# Patient Record
Sex: FEMALE | Race: WHITE | URBAN - METROPOLITAN AREA
[De-identification: names, ages, dates, MRNs, and addresses within clinical notes are randomized per-mention and may not be internally consistent; named-entity substitution may affect disease eponyms.]

---

## 2017-11-16 ENCOUNTER — TRANSFERRED RECORDS (OUTPATIENT)
Dept: HEALTH INFORMATION MANAGEMENT | Facility: CLINIC | Age: 67
End: 2017-11-16

## 2017-11-28 ENCOUNTER — TRANSFERRED RECORDS (OUTPATIENT)
Dept: HEALTH INFORMATION MANAGEMENT | Facility: CLINIC | Age: 67
End: 2017-11-28

## 2018-03-22 ENCOUNTER — OFFICE VISIT (OUTPATIENT)
Dept: DERMATOLOGY | Facility: CLINIC | Age: 68
End: 2018-03-22
Payer: COMMERCIAL

## 2018-03-22 DIAGNOSIS — R21 RASH: Primary | ICD-10-CM

## 2018-03-22 DIAGNOSIS — L28.0 LICHEN SIMPLEX CHRONICUS: ICD-10-CM

## 2018-03-22 RX ORDER — NYSTATIN 100000 U/G
CREAM TOPICAL 2 TIMES DAILY
COMMUNITY

## 2018-03-22 RX ORDER — AMLODIPINE BESYLATE 5 MG/1
5 TABLET ORAL DAILY
COMMUNITY

## 2018-03-22 RX ORDER — VENLAFAXINE HYDROCHLORIDE 150 MG/1
150 CAPSULE, EXTENDED RELEASE ORAL DAILY
COMMUNITY

## 2018-03-22 RX ORDER — ERGOCALCIFEROL 1.25 MG/1
50000 CAPSULE, LIQUID FILLED ORAL WEEKLY
COMMUNITY

## 2018-03-22 RX ORDER — HYDRALAZINE HYDROCHLORIDE 50 MG/1
50 TABLET, FILM COATED ORAL 2 TIMES DAILY
COMMUNITY

## 2018-03-22 RX ORDER — TRIAMCINOLONE ACETONIDE 1 MG/G
OINTMENT TOPICAL
Qty: 80 G | Refills: 4 | Status: SHIPPED | OUTPATIENT
Start: 2018-03-22 | End: 2018-03-22

## 2018-03-22 RX ORDER — LIDOCAINE HYDROCHLORIDE AND EPINEPHRINE 10; 10 MG/ML; UG/ML
3 INJECTION, SOLUTION INFILTRATION; PERINEURAL ONCE
Qty: 3 ML | Refills: 0 | OUTPATIENT
Start: 2018-03-22 | End: 2018-03-22

## 2018-03-22 RX ORDER — TRIAMCINOLONE ACETONIDE 1 MG/G
OINTMENT TOPICAL
Qty: 80 G | Refills: 4 | Status: SHIPPED | OUTPATIENT
Start: 2018-03-22

## 2018-03-22 RX ORDER — GINSENG 100 MG
1 CAPSULE ORAL DAILY
COMMUNITY

## 2018-03-22 ASSESSMENT — ACTIVITIES OF DAILY LIVING (ADL)
NUMBER_OF_TIMES_PATIENT_HAS_FALLEN_WITHIN_LAST_SIX_MONTHS: 4
DRESS: 0-->INDEPENDENT
CHANGE_IN_FUNCTIONAL_STATUS_SINCE_ONSET_OF_CURRENT_ILLNESS/INJURY: NO
RETIRED_COMMUNICATION: 0-->UNDERSTANDS/COMMUNICATES WITHOUT DIFFICULTY
TRANSFERRING: 1-->ASSISTIVE EQUIPMENT
RETIRED_EATING: 0-->INDEPENDENT
TRANSFERRING: 1 - ASSISTIVE EQUIPMENT
DRESS: 0 - INDEPENDENT
SWALLOWING: 0 - SWALLOWS FOODS/LIQUIDS WITHOUT DIFFICULTY
COGNITION: 0 - NO COGNITION ISSUES REPORTED
BATHING: 1-->ASSISTIVE EQUIPMENT
TOILETING: 1-->ASSISTIVE EQUIPMENT
COMMUNICATION: 0 - UNDERSTANDS/COMMUNICATES WITHOUT DIFFICULTY
TOILETING: 1 - ASSISTIVE EQUIPMENT
AMBULATION: 1 - ASSISTIVE EQUIPMENT
BATHING: 0 - INDEPENDENT
AMBULATION: 1-->ASSISTIVE EQUIPMENT
EATING: 0 - INDEPENDENT
FALL_HISTORY_WITHIN_LAST_SIX_MONTHS: YES
SWALLOWING: 0-->SWALLOWS FOODS/LIQUIDS WITHOUT DIFFICULTY

## 2018-03-22 ASSESSMENT — PAIN SCALES - GENERAL: PAINLEVEL: NO PAIN (0)

## 2018-03-22 NOTE — NURSING NOTE
Dermatology Rooming Note    Annette Longo's goals for this visit include:   Chief Complaint   Patient presents with     Derm Problem     Annette is returning to discuss eczema.     Carissa Rodrigues LPN

## 2018-03-22 NOTE — LETTER
3/22/2018       RE: Treva Longo  [address] [place] [id number]     Dear Colleague,    Thank you for referring your patient, Treva Longo, to the [facility] DERMATOLOGY at [facility]. Please see a copy of my visit note below.    CHIEF COMPLAINT:  Chronic itchy rash on back, buttocks and legs.      HISTORY OF PRESENT ILLNESS:  Treva is a very pleasant 67-year-old female who is a new patient to our clinic presenting with a 5-year history of a chronic intractable pruritic scaly eruption on her buttocks, back and legs.  She has previously seen Dr. Minna Gaspar at [facility] in [place], who has tried a variety of therapies including many topical steroids including triamcinolone, as well as both oral and injectable methotrexate and UVB phototherapy.  Treva is highly frustrated in that some of these therapies have been transiently helpful but they tend to lose their effect as soon as she stops them.  She reports that she did up to 80 treatments of UVB light therapy and had some good improvement but that the itch and rash rapidly recurred once she stopped.  Similarly, she had some improvement on injectable methotrexate, but the rash rapidly recurred with discontinuation.  Today, she is very frustrated.  She is looking for a long-term solution for this issue.  She describes intensely itchy spots which she scratches at bedtime and while she is sleeping, most notably on her buttocks, but also on legs and back.  Prior biopsies have shown features consistent with lichen simplex chronicus.      REVIEW OF SYSTEMS:  No recent fevers or chills.  No oral sores.      PAST MEDICAL HISTORY:  She has had a knee replacement with MRSA infection.  She sees Dr. Birch, an Infectious Disease specialist, who has her currently taking Keflex once daily long-term.      PHYSICAL EXAMINATION:   GENERAL:  This is a well-appearing, well-nourished, older female with a  normal mood and affect who is oriented x3.   SKIN:  A cutaneous exam of the head, neck, chest, abdomen, back, bilateral upper and lower extremities was performed.  Symmetrically surrounding the gluteal cleft, there are lichenified erythematous scaly plaques with fine branny scale.  Similar-appearing lichenified scaly papules are present on her lower back and legs.  Excoriated eroded papules are also present on the thighs.      ASSESSMENT/PLAN:  Exam features most consistent with a lichenified chronic eczematous dermatitis and a prior biopsy showing LSC.  I also considered the possibility of genito-gluteal type porokeratosis or lichenified psoriasis, given the symmetric pattern around her buttocks.  A biopsy was performed today largely to exclude these possibilities.  Please see the procedure note below.  Otherwise, we had a long discussion about possible treatment options for chronically lichenified eczema.  We discussed the possibility of UVA phototherapy here in our office, but it is very difficult for her to travel from [place] to our clinic; this does not seem to be a reasonable option at this time.  We also discussed revisiting methotrexate or possibly dupilumab.  There has been a concern about her history of MRSA infection and whether dupilumab would be safe.  Our somewhat limited experience (as dupilumab has only been available for approximately 1 year's time) is that this is generally safe and not highly immunosuppressive, and I believe that a cautious trial of this medication might be reasonable if she fails other modalities.  Today we discussed intensive topical therapy with a soak and smear method and I would like to attempt to prove to Treva that this is effective long-term.  We gave her a prescription for triamcinolone 80 grams to be mixed in its entirety with 16 ounces of a bland moisturizing cream and kept in the refrigerator.  At bedtime, she will take a dilute bleach bath with 1/2 cup of  plain Clorox in a full tub of and then apply this mixture and wear a sauna suit to bed overnight.  I gave her printed instructions regarding obtainin.  Nowata moisturizers.   2.  Sauna suit on a website such as Amazon.   3.  L'Applique, a lotion applicator which she can use to apply cream on her back and buttocks.      We will have her return to clinic in 3-4 weeks' time, at which time we will reassess and decide if dupilumab may be a next reasonable step.      PROCEDURE NOTE:  After signed informed consent, the affected area was  swabbed with an alcohol pad and injected with 1% lidocaine with epinephrine.  A 4 mm punch biopsy was taken from the left medial buttock and sent for histopathology.  The defect was closed with a Prolene 4-0 suture and covered with petrolatum and a bandage.  The patient tolerated this without complication.       Myles Garcia MD  Dermatology Attending              Pictures were placed in Pt's chart today for future reference.

## 2018-03-22 NOTE — MR AVS SNAPSHOT
"              After Visit Summary   3/22/2018    Annette Longo    MRN: [id number]           Patient Information     Date Of Birth          1950        Visit Information        Provider Department      3/22/2018 1:00 PM Myles Garcia MD [facility] Dermatology        Today's Diagnoses     Rash    -  1    Lichen simplex chronicus          Care Instructions    Soak and Smear method    Bleach baths:    Add one-half cup of plain Clorox bleach to a full tub (Not \"splashless\")  Soak for 15 minutes  Gently dry off (pat dry)  Smear the mixture over all your itchy skin  Wear a sauna suit to bed    Mixture is:  Entire tube of triamcinolone ointment  Mixed with a 16 ounce jar of moisturizer (Examples: Vanicream, Cetaphil or Eucerin cream)    Cindy grocery list:    1. Sauna suit  Amazon, [employer] or other websites  https://www.SensorTran/Freshtake Media-JibJabories-Sauna-Small-Medium/dp/I300S0EKZO/ref=sr_1_13?ie=UTF8&ozt=4923437229&sr=8-13&keywords=sauna+suit    2. L'Applique  http://www.FUZE Fit For A Kid!pliqueDiabetes America/    3. 16 ounce jars of cream  [facility] or [facility]    Wound Care After a Biopsy    What is a skin biopsy?  A skin biopsy allows the doctor to examine a very small piece of tissue under the microscope to determine the diagnosis and the best treatment for the skin condition. A local anesthetic (numbing medicine)  is injected with a very small needle into the skin area to be tested. A small piece of skin is taken from the area. Sometimes a suture (stitch) is used.     What are the risks of a skin biopsy?  I will experience scar, bleeding, swelling, pain, crusting and redness. I may experience incomplete removal or recurrence. Risks of this procedure are excessive bleeding, bruising, infection, nerve damage, numbness, thick (hypertrophic or keloidal) scar and non-diagnostic biopsy.    How should I care for my wound for the first 24 hours?    Keep the wound dry and covered for 24 hours    If it bleeds, hold direct pressure on the " area for 15 minutes. If bleeding does not stop then go to the emergency room    Avoid strenuous exercise the first 1-2 days or as your doctor instructs you    How should I care for the wound after 24 hours?    After 24 hours, remove the bandage    You may bathe or shower as normal    If you had a scalp biopsy, you can shampoo as usual and can use shower water to clean the biopsy site daily    Clean the wound twice a day with gentle soap and water    Do not scrub, be gentle    Apply white petroleum/Vaseline after cleaning the wound with a cotton swab or a clean finger, and keep the site covered with a Bandaid /bandage. Bandages are not necessary with a scalp biopsy    If you are unable to cover the site with a Bandaid /bandage, re-apply ointment 2-3 times a day to keep the site moist. Moisture will help with healing    Avoid strenuous activity for first 1-2 days    Avoid lakes, rivers, pools, and oceans until the stitches are removed or the site is healed    How do I clean my wound?    Wash hands thoroughly with soap or use hand  before all wound care    Clean the wound with gentle soap and water    Apply white petroleum/Vaseline  to wound after it is clean    Replace the Bandaid /bandage to keep the wound covered for the first few days or as instructed by your doctor    If you had a scalp biopsy, warm shower water to the area on a daily basis should suffice    What should I use to clean my wound?     Cotton-tipped applicators (Qtips )    White petroleum jelly (Vaseline ). Use a clean new container and use Q-tips to apply.    Bandaids   as needed    Gentle soap     How should I care for my wound long term?    Do not get your wound dirty    Keep up with wound care for one week or until the area is healed.    A small scab will form and fall off by itself when the area is completely healed. The area will be red and will become pink in color as it heals. Sun protection is very important for how your scar will  turn out. Sunscreen with an SPF 30 or greater is recommended once the area is healed.    If you have stitches, stitches need to be removed in 14 days. You may return to our clinic for this or you may have it done locally at your doctor s office.    You should have some soreness but it should be mild and slowly go away over several days. Talk to your doctor about using tylenol for pain,    When should I call my doctor?  If you have increased:     Pain or swelling    Pus or drainage (clear or slightly yellow drainage is ok)    Temperature over 100F    Spreading redness or warmth around wound    When will I hear about my results?  The biopsy results can take 2-3 weeks to come back. The clinic will call you with the results, send you a myMedScore message, or have you schedule a follow-up clinic or phone time to discuss the results. Contact our clinics if you do not hear from us in 3 weeks.     Who should I call with questions?    [facility]: [phone]     [facility]: [phone]    For urgent needs outside of business hours call the [facility] at [phone] and ask for the dermatology resident on call            Follow-ups after your visit        Your next 10 appointments already scheduled     Apr 17, 2018 11:00 AM CDT   (Arrive by 10:45 AM)   Return Visit with Myles Garcia MD   [facility] Dermatology ([facility])    [address] [place] [id number] [phone]              Who to contact     Please call your clinic at [phone] to:    Ask questions about your health    Make or cancel appointments    Discuss your medicines    Learn about your test results    Speak to your doctor            Additional Information About Your Visit        Nursing Home Quality Information     [facility] is an electronic gateway that provides easy, online access to your medical records. With [facility], you can request a clinic  appointment, read your test results, renew a prescription or communicate with your care team.     To sign up for MyChart visit the website at [email or web address]   You will be asked to enter the access code listed below, as well as some personal information. Please follow the directions to create your username and password.     Your access code is: [email or web address]  Expires: 2018  7:30 AM     Your access code will  in 90 days. If you need help or a new code, please contact your [facility] or call [phone] for assistance.        Care EveryWhere ID     This is your Care EveryWhere ID. This could be used by other organizations to access your [place] medical records  [id number]         Blood Pressure from Last 3 Encounters:   No data found for BP    Weight from Last 3 Encounters:   No data found for Wt              We Performed the Following     BIOPSY SKIN/SUBQ/MUC MEM, SINGLE LESION     Dermatological path order and indications          Today's Medication Changes          These changes are accurate as of 3/22/18  2:11 PM.  If you have any questions, ask your nurse or doctor.               Start taking these medicines.        Dose/Directions    lidocaine 1% with EPINEPHrine 1:100,000 1 %-1:258341 injection   Used for:  Rash   Started by:  Myles Garcia MD        Dose:  3 mL   Inject 3 mLs into the skin once for 1 dose   Quantity:  3 mL   Refills:  0       triamcinolone 0.1 % ointment   Commonly known as:  KENALOG   Used for:  Lichen simplex chronicus   Started by:  Myles Garcia MD        Mix entire tube with 16ounce jar of moisturizer, apply at bedtime   Quantity:  80 g   Refills:  4            Where to get your medicines      These medications were sent to [facility] - [place], [place] - [address] AT [facility]  [address], [facility] [address]     Phone:  [phone]     triamcinolone 0.1 % ointment          Some of these will need a paper prescription and others can be bought over the counter.  Ask your nurse if you have questions.     You don't need a prescription for these medications     lidocaine 1% with EPINEPHrine 1:100,000 1 %-1:496579 injection                Primary Care Provider Office Phone # Fax #    Stewart Murphy [phone]       [facility] [address] [facility] [address]        Equal Access to Services     JACOB RUBI : Hadii aad ku hadasho Soomaali, waaxda luqadaha, qaybta kaalmada adeegyada, waxay idiin hayaan adeeg kharash lagwendolyn . So [facility] [phone].    ATENCIÓN: Si airam johnson, tiene a jefferson disposición servicios gratuitos de asistencia lingüística. [facility] [phone].    We comply with applicable federal civil rights laws and [place] laws. We do not discriminate on the basis of race, color, national origin, age, disability, sex, sexual orientation, or gender identity.            Thank you!     Thank you for choosing [facility] DERMATOLOGY  for your care. Our goal is always to provide you with excellent care. Hearing back from our patients is one way we can continue to improve our services. Please take a few minutes to complete the written survey that you may receive in the mail after your visit with us. Thank you!             Your Updated Medication List - Protect others around you: Learn how to safely use, store and throw away your medicines at www.disposemymeds.org.          This list is accurate as of 3/22/18  2:11 PM.  Always use your most recent med list.                   Brand Name Dispense Instructions for use Diagnosis    lidocaine 1% with EPINEPHrine 1:100,000 1 %-1:232041 injection     3 mL    Inject 3 mLs into the skin once for 1 dose    Rash       triamcinolone 0.1 % ointment    KENALOG    80 g    Mix entire tube with 16ounce jar of moisturizer, apply at bedtime    Lichen simplex chronicus

## 2018-03-22 NOTE — PATIENT INSTRUCTIONS
"Soak and Smear method    Bleach baths:    Add one-half cup of plain Clorox bleach to a full tub (Not \"splashless\")  Soak for 15 minutes  Gently dry off (pat dry)  Smear the mixture over all your itchy skin  Wear a sauna suit to bed    Mixture is:  Entire tube of triamcinolone ointment  Mixed with a 16 ounce jar of moisturizer (Examples: Vanicream, Cetaphil or Eucerin cream)    AnnetteSoraay list:    1. Sauna suit  Amazon, [employer] or other websites  https://www.GenSpera/KARL-Accessories-Sauna-Small-Medium/dp/O826T1JBAJ/ref=sr_1_13?ie=UTF8&vda=2127580081&sr=8-13&keywords=sauna+suit    2. L'Applique  http://www.Tenaxis MedicaliqueCare-n-Share/    3. 16 ounce jars of cream  [facility] or [facility]    Wound Care After a Biopsy    What is a skin biopsy?  A skin biopsy allows the doctor to examine a very small piece of tissue under the microscope to determine the diagnosis and the best treatment for the skin condition. A local anesthetic (numbing medicine)  is injected with a very small needle into the skin area to be tested. A small piece of skin is taken from the area. Sometimes a suture (stitch) is used.     What are the risks of a skin biopsy?  I will experience scar, bleeding, swelling, pain, crusting and redness. I may experience incomplete removal or recurrence. Risks of this procedure are excessive bleeding, bruising, infection, nerve damage, numbness, thick (hypertrophic or keloidal) scar and non-diagnostic biopsy.    How should I care for my wound for the first 24 hours?    Keep the wound dry and covered for 24 hours    If it bleeds, hold direct pressure on the area for 15 minutes. If bleeding does not stop then go to the emergency room    Avoid strenuous exercise the first 1-2 days or as your doctor instructs you    How should I care for the wound after 24 hours?    After 24 hours, remove the bandage    You may bathe or shower as normal    If you had a scalp biopsy, you can shampoo as usual and can use shower water to clean the " biopsy site daily    Clean the wound twice a day with gentle soap and water    Do not scrub, be gentle    Apply white petroleum/Vaseline after cleaning the wound with a cotton swab or a clean finger, and keep the site covered with a Bandaid /bandage. Bandages are not necessary with a scalp biopsy    If you are unable to cover the site with a Bandaid /bandage, re-apply ointment 2-3 times a day to keep the site moist. Moisture will help with healing    Avoid strenuous activity for first 1-2 days    Avoid lakes, rivers, pools, and oceans until the stitches are removed or the site is healed    How do I clean my wound?    Wash hands thoroughly with soap or use hand  before all wound care    Clean the wound with gentle soap and water    Apply white petroleum/Vaseline  to wound after it is clean    Replace the Bandaid /bandage to keep the wound covered for the first few days or as instructed by your doctor    If you had a scalp biopsy, warm shower water to the area on a daily basis should suffice    What should I use to clean my wound?     Cotton-tipped applicators (Qtips )    White petroleum jelly (Vaseline ). Use a clean new container and use Q-tips to apply.    Bandaids   as needed    Gentle soap     How should I care for my wound long term?    Do not get your wound dirty    Keep up with wound care for one week or until the area is healed.    A small scab will form and fall off by itself when the area is completely healed. The area will be red and will become pink in color as it heals. Sun protection is very important for how your scar will turn out. Sunscreen with an SPF 30 or greater is recommended once the area is healed.    If you have stitches, stitches need to be removed in 14 days. You may return to our clinic for this or you may have it done locally at your doctor s office.    You should have some soreness but it should be mild and slowly go away over several days. Talk to your doctor about using  tylenol for pain,    When should I call my doctor?  If you have increased:     Pain or swelling    Pus or drainage (clear or slightly yellow drainage is ok)    Temperature over 100F    Spreading redness or warmth around wound    When will I hear about my results?  The biopsy results can take 2-3 weeks to come back. The clinic will call you with the results, send you a Vital Viot message, or have you schedule a follow-up clinic or phone time to discuss the results. Contact our clinics if you do not hear from us in 3 weeks.     Who should I call with questions?    [facility]: [phone]     [facility]: [phone]    For urgent needs outside of business hours call the [facility] at [phone] and ask for the dermatology resident on call

## 2018-03-22 NOTE — NURSING NOTE
Lidocaine-epinephrine 1-1:313475 % injection   1.5mL once for one use, starting 3/22/2018 ending 3/22/2018,  2mL disp, R-0, injection  Injected by Nohemi Mak CMA

## 2018-03-22 NOTE — PROGRESS NOTES
CHIEF COMPLAINT:  Chronic itchy rash on back, buttocks and legs.      HISTORY OF PRESENT ILLNESS:  Treva is a very pleasant 67-year-old female who is a new patient to our clinic presenting with a 5-year history of a chronic intractable pruritic scaly eruption on her buttocks, back and legs.  She has previously seen Dr. Minna Gaspar at [facility] in [place], who has tried a variety of therapies including many topical steroids including triamcinolone, as well as both oral and injectable methotrexate and UVB phototherapy.  Treva is highly frustrated in that some of these therapies have been transiently helpful but they tend to lose their effect as soon as she stops them.  She reports that she did up to 80 treatments of UVB light therapy and had some good improvement but that the itch and rash rapidly recurred once she stopped.  Similarly, she had some improvement on injectable methotrexate, but the rash rapidly recurred with discontinuation.  Today, she is very frustrated.  She is looking for a long-term solution for this issue.  She describes intensely itchy spots which she scratches at bedtime and while she is sleeping, most notably on her buttocks, but also on legs and back.  Prior biopsies have shown features consistent with lichen simplex chronicus.      REVIEW OF SYSTEMS:  No recent fevers or chills.  No oral sores.      PAST MEDICAL HISTORY:  She has had a knee replacement with MRSA infection.  She sees Dr. Birch, an Infectious Disease specialist, who has her currently taking Keflex once daily long-term.      PHYSICAL EXAMINATION:   GENERAL:  This is a well-appearing, well-nourished, older female with a normal mood and affect who is oriented x3.   SKIN:  A cutaneous exam of the head, neck, chest, abdomen, back, bilateral upper and lower extremities was performed.  Symmetrically surrounding the gluteal cleft, there are lichenified erythematous scaly plaques with fine branny scale.   Similar-appearing lichenified scaly papules are present on her lower back and legs.  Excoriated eroded papules are also present on the thighs.      ASSESSMENT/PLAN:  Exam features most consistent with a lichenified chronic eczematous dermatitis and a prior biopsy showing LSC.  I also considered the possibility of genito-gluteal type porokeratosis or lichenified psoriasis, given the symmetric pattern around her buttocks.  A biopsy was performed today largely to exclude these possibilities.  Please see the procedure note below.  Otherwise, we had a long discussion about possible treatment options for chronically lichenified eczema.  We discussed the possibility of UVA phototherapy here in our office, but it is very difficult for her to travel from [place] to our clinic; this does not seem to be a reasonable option at this time.  We also discussed revisiting methotrexate or possibly dupilumab.  There has been a concern about her history of MRSA infection and whether dupilumab would be safe.  Our somewhat limited experience (as dupilumab has only been available for approximately 1 year's time) is that this is generally safe and not highly immunosuppressive, and I believe that a cautious trial of this medication might be reasonable if she fails other modalities.  Today we discussed intensive topical therapy with a soak and smear method and I would like to attempt to prove to Treva that this is effective long-term.  We gave her a prescription for triamcinolone 80 grams to be mixed in its entirety with 16 ounces of a bland moisturizing cream and kept in the refrigerator.  At bedtime, she will take a dilute bleach bath with 1/2 cup of plain Clorox in a full tub of and then apply this mixture and wear a sauna suit to bed overnight.  I gave her printed instructions regarding obtainin.  [place] moisturizers.   2.  Sauna suit on a website such as [employer].   3.  L'Applique, a lotion applicator which she can use to  apply cream on her back and buttocks.      We will have her return to clinic in 3-4 weeks' time, at which time we will reassess and decide if dupilumab may be a next reasonable step.      PROCEDURE NOTE:  After signed informed consent, the affected area was  swabbed with an alcohol pad and injected with 1% lidocaine with epinephrine.  A 4 mm punch biopsy was taken from the left medial buttock and sent for histopathology.  The defect was closed with a Prolene 4-0 suture and covered with petrolatum and a bandage.  The patient tolerated this without complication.       Myles Garcia MD  Dermatology Attending

## 2018-04-02 LAB — COPATH REPORT: NORMAL

## 2018-04-03 PROBLEM — R21 RASH: Status: ACTIVE | Noted: 2018-04-03

## 2018-04-03 PROBLEM — L28.0 LICHEN SIMPLEX CHRONICUS: Status: ACTIVE | Noted: 2018-04-03

## 2018-04-17 ENCOUNTER — OFFICE VISIT (OUTPATIENT)
Dept: DERMATOLOGY | Facility: CLINIC | Age: 68
End: 2018-04-17
Payer: COMMERCIAL

## 2018-04-17 ENCOUNTER — TELEPHONE (OUTPATIENT)
Dept: DERMATOLOGY | Facility: CLINIC | Age: 68
End: 2018-04-17

## 2018-04-17 DIAGNOSIS — L40.9 PSORIASIS: ICD-10-CM

## 2018-04-17 DIAGNOSIS — L40.9 PSORIASIS: Primary | ICD-10-CM

## 2018-04-17 LAB
ALBUMIN SERPL-MCNC: 3.3 G/DL (ref 3.4–5)
ALP SERPL-CCNC: 122 U/L (ref 40–150)
ALT SERPL W P-5'-P-CCNC: 22 U/L (ref 0–50)
ANION GAP SERPL CALCULATED.3IONS-SCNC: 6 MMOL/L (ref 3–14)
AST SERPL W P-5'-P-CCNC: 22 U/L (ref 0–45)
BASOPHILS # BLD AUTO: 0.1 10E9/L (ref 0–0.2)
BASOPHILS NFR BLD AUTO: 0.8 %
BILIRUB SERPL-MCNC: 0.3 MG/DL (ref 0.2–1.3)
BUN SERPL-MCNC: 21 MG/DL (ref 7–30)
CALCIUM SERPL-MCNC: 8.7 MG/DL (ref 8.5–10.1)
CHLORIDE SERPL-SCNC: 102 MMOL/L (ref 94–109)
CO2 SERPL-SCNC: 25 MMOL/L (ref 20–32)
CREAT SERPL-MCNC: 1.33 MG/DL (ref 0.52–1.04)
DIFFERENTIAL METHOD BLD: ABNORMAL
EOSINOPHIL # BLD AUTO: 0.9 10E9/L (ref 0–0.7)
EOSINOPHIL NFR BLD AUTO: 13.6 %
ERYTHROCYTE [DISTWIDTH] IN BLOOD BY AUTOMATED COUNT: 13.4 % (ref 10–15)
GFR SERPL CREATININE-BSD FRML MDRD: 40 ML/MIN/1.7M2
GLUCOSE SERPL-MCNC: 124 MG/DL (ref 70–99)
HBV SURFACE AG SERPL QL IA: NONREACTIVE
HCT VFR BLD AUTO: 31.7 % (ref 35–47)
HCV AB SERPL QL IA: NONREACTIVE
HGB BLD-MCNC: 10.7 G/DL (ref 11.7–15.7)
IMM GRANULOCYTES # BLD: 0 10E9/L (ref 0–0.4)
IMM GRANULOCYTES NFR BLD: 0.6 %
LYMPHOCYTES # BLD AUTO: 1.1 10E9/L (ref 0.8–5.3)
LYMPHOCYTES NFR BLD AUTO: 16.4 %
MCH RBC QN AUTO: 33.5 PG (ref 26.5–33)
MCHC RBC AUTO-ENTMCNC: 33.8 G/DL (ref 31.5–36.5)
MCV RBC AUTO: 99 FL (ref 78–100)
MONOCYTES # BLD AUTO: 0.6 10E9/L (ref 0–1.3)
MONOCYTES NFR BLD AUTO: 8.5 %
NEUTROPHILS # BLD AUTO: 3.9 10E9/L (ref 1.6–8.3)
NEUTROPHILS NFR BLD AUTO: 60.1 %
NRBC # BLD AUTO: 0 10*3/UL
NRBC BLD AUTO-RTO: 0 /100
PLATELET # BLD AUTO: 179 10E9/L (ref 150–450)
POTASSIUM SERPL-SCNC: 4.5 MMOL/L (ref 3.4–5.3)
PROT SERPL-MCNC: 7.3 G/DL (ref 6.8–8.8)
RBC # BLD AUTO: 3.19 10E12/L (ref 3.8–5.2)
SODIUM SERPL-SCNC: 134 MMOL/L (ref 133–144)
WBC # BLD AUTO: 6.5 10E9/L (ref 4–11)

## 2018-04-17 ASSESSMENT — PAIN SCALES - GENERAL: PAINLEVEL: MODERATE PAIN (5)

## 2018-04-17 NOTE — PROGRESS NOTES
[facility] Dermatology Note      Dermatology Problem List:  1. Psoriasis  Longstanding pruritic eruption for past 5 years. Previous diagnostic dilemma. Has tried MTX and phototherapy with some improvement though not adequate control. Bx 3/22/18 most c/w psoriasis. Cannot fully r/o lichenified chronic eczematous dermatitis.   - plan to begin humira 40 mg q 2 weeks (begin with 80 mg loading dose)  - will check CBC with diff, CMP, hep B and C, and quant gold today    Encounter Date: Apr 17, 2018    CC:   Chief Complaint   Patient presents with     Derm Problem     Rash follow up, Annette does not note improvement.         History of Present Illness:  Ms. Annette Longo is a 68 year old female who presents as a follow-up for longstanding skin eruption. She is accompanied today by her seeing eye dog, Paz. The patient was last seen 3/22 when a biopsy was obtained. Pt states that her condition is unchanged from last visit. States she continues to be quite miserable with all of her skin involvement and how itchy it is. She says she tried the sauna suit but was not able to sleep in this at all due to being so uncomfortable. She is frustrated as she feels this has been ongoing for a very long time and has never gotten a right diagnosis or good treatment. She says she now has a new area of involvement on her right ankle which is bothering her a lot.   Pt otherwise feels well without any changes in general state of health. Denies any new, growing, changing, bleeding, or otherwise concerning/symptomatic skin findings. No other questions or concerns today.        Past Medical History:   Patient Active Problem List   Diagnosis     Rash     Lichen simplex chronicus     No past medical history on file.  No past surgical history on file.      Medications:  Current Outpatient Prescriptions   Medication Sig Dispense Refill     triamcinolone (KENALOG) 0.1 % ointment Mix entire tube with 16ounce jar of  moisturizer, apply at bedtime 80 g 4     Atorvastatin Calcium (LIPITOR PO)        amLODIPine (NORVASC) 5 MG tablet Take 5 mg by mouth daily       Calcium-Magnesium-Vitamin D (CALCIUM MAGNESIUM PO) Take 600 mg by mouth       CEPHALEXIN PO Take 500 mg by mouth       CHLORTHALIDONE PO Take 25 mg by mouth       CLOPIDOGREL BISULFATE PO Take 75 mg by mouth       FOLIC ACID PO Take 800 mcg by mouth daily       ISOSORBIDE DINITRATE PO Take 10 mg by mouth       Insulin NPH Human, Isophane, (HUMULIN N SC)        hydrALAZINE (APRESOLINE) 50 MG tablet Take 50 mg by mouth 3 times daily       Lysine 1000 MG TABS        METOPROLOL SUCCINATE ER PO Take 50 mg by mouth       Melatonin-Pyridoxine (MELATIN PO) Take 3 mg by mouth       nystatin (MYCOSTATIN) cream Apply topically 2 times daily       PRIMIDONE PO Take 250 mg by mouth       SODIUM BICARBONATE PO Take 650 mg by mouth       Levothyroxine Sodium (SYNTHROID PO) Take 100 mcg by mouth       TURMERIC PO Take 1,000 mg by mouth       Venlafaxine HCl (EFFEXOR PO) Take by mouth 3 times daily       venlafaxine (EFFEXOR XR) 150 MG 24 hr capsule Take by mouth daily       vitamin D (ERGOCALCIFEROL) 29969 UNIT capsule Take 50,000 Units by mouth       Pyridoxine HCl (VITAMIN B6 PO) Take 100 mg by mouth       zinc 50 MG TABS        FLUCONAZOLE PO Take 150 mg by mouth          Allergies   Allergen Reactions     Mirtazapine Unknown     Adhesive Tape Rash     Bupropion Unknown     Ciprofloxacin Rash     Codeine Unknown, Nausea and Vomiting and Rash     Tolterodine Other (See Comments)     Ace Inhibitors Unknown and Other (See Comments)     No Clinical Screening - See Comments Swelling     In high school     Cyclobenzaprine Unknown and Other (See Comments)         Review of Systems:  -As per HPI  -Constitutional: The patient denies fatigue, fevers, chills, unintended weight loss, and night sweats.  -HEENT: Patient denies nonhealing oral sores.  -Skin: As above in HPI. No additional skin  concerns.    Physical exam:  Vitals: There were no vitals taken for this visit.  GEN: This is a well developed, well-nourished female in no acute distress, in a pleasant mood.    SKIN: Focused examination of the face, neck, abdomen, back, buttocks, b/l UEs and b/l LEs was performed.  - FP I/II  - lichenified, erythematous, somewhat eczematous appearing pink thin plaques with symmetric distribution involving gluteal cleft, medial buttocks, lower back; similar lesions on upper and lower abdomen  - excoriated eroded papules on right lateral ankle, right forearm.   - splinter hemorrhages of nails  -There are waxy stuck on tan to brown papules on the trunk.  - No other lesions of concern on areas examined.     Impression/Plan:  1. Psoriasis (chronic pruritic eruption, previously dx'ed as LSC, but with most recent bx favoring psoriasis as most likely)  Pt with longstanding pruritic skin eruption for past 5 years. Previously unclear diagnosis. Recent biopsy at last visit most c/w psoriasis. Had previously suspected this to be a lichenified chronic eczematous dermatitis and cannot fully rule this out at this time. Pt has previously tried phototherapy and MTX without adequate response. Discussion with patient today regarding targeted treatment for psoriasis with humira. Discussed risks and benefits with patient. Patient agreeable and would like to start. Will plan to check surveillance labs and pending outcome begin humira.   - plan to begin humira 40 mg q 2 weeks (begin with 80 mg loading dose)  - will check CBC with diff, CMP, hep B and C, and quant gold today  - continue topicals as before  - plan to see back in 3 months to assess response        Follow-up in 3 months, earlier for new or changing lesions.       Dr. Garcia staffed the patient.    Staff Involved:  Resident(Royer Phillips)/Staff(as above)    I have seen and examined this patient and agree with the assessment and plan as documented in the resident's  note.    Myles Garcia MD  Dermatology Attending

## 2018-04-17 NOTE — TELEPHONE ENCOUNTER
PA Initiation    Medication: adalimumab (Humira) 40mg/ 0.8mL PFS  Insurance Company: [employer] - Phone [phone] Fax [phone]  Pharmacy Filling the Rx: [facility] - [address]  Filling Pharmacy Phone: [phone]  Filling Pharmacy Fax:    Start Date: 4/17/2018

## 2018-04-17 NOTE — LETTER
4/17/2018       RE: Annette Longo  [address] [place] [id number]     Dear Colleague,    Thank you for referring your patient, Annette Longo, to the [facility] DERMATOLOGY at [facility]. Please see a copy of my visit note below.    [facility] Dermatology Note      Dermatology Problem List:  1. Psoriasis  Longstanding pruritic eruption for past 5 years. Previous diagnostic dilemma. Has tried MTX and phototherapy with some improvement though not adequate control. Bx 3/22/18 most c/w psoriasis. Cannot fully r/o lichenified chronic eczematous dermatitis.   - plan to begin humira 40 mg q 2 weeks (begin with 80 mg loading dose)  - will check CBC with diff, CMP, hep B and C, and quant gold today    Encounter Date: Apr 17, 2018    CC:   Chief Complaint   Patient presents with     Derm Problem     Rash follow up, Annette does not note improvement.         History of Present Illness:  Ms. Annette Longo is a 68 year old female who presents as a follow-up for longstanding skin eruption. She is accompanied today by her seeing eye dog, Paz. The patient was last seen 3/22 when a biopsy was obtained. Pt states that her condition is unchanged from last visit. States she continues to be quite miserable with all of her skin involvement and how itchy it is. She says she tried the sauna suit but was not able to sleep in this at all due to being so uncomfortable. She is frustrated as she feels this has been ongoing for a very long time and has never gotten a right diagnosis or good treatment. She says she now has a new area of involvement on her right ankle which is bothering her a lot.   Pt otherwise feels well without any changes in general state of health. Denies any new, growing, changing, bleeding, or otherwise concerning/symptomatic skin findings. No other questions or concerns today.     Past Medical History:   Patient Active Problem List    Diagnosis     Rash     Lichen simplex chronicus     No past medical history on file.  No past surgical history on file.    Medications:  Current Outpatient Prescriptions   Medication Sig Dispense Refill     triamcinolone (KENALOG) 0.1 % ointment Mix entire tube with 16ounce jar of moisturizer, apply at bedtime 80 g 4     Atorvastatin Calcium (LIPITOR PO)        amLODIPine (NORVASC) 5 MG tablet Take 5 mg by mouth daily       Calcium-Magnesium-Vitamin D (CALCIUM MAGNESIUM PO) Take 600 mg by mouth       CEPHALEXIN PO Take 500 mg by mouth       CHLORTHALIDONE PO Take 25 mg by mouth       CLOPIDOGREL BISULFATE PO Take 75 mg by mouth       FOLIC ACID PO Take 800 mcg by mouth daily       ISOSORBIDE DINITRATE PO Take 10 mg by mouth       Insulin NPH Human, Isophane, (HUMULIN N SC)        hydrALAZINE (APRESOLINE) 50 MG tablet Take 50 mg by mouth 3 times daily       Lysine 1000 MG TABS        METOPROLOL SUCCINATE ER PO Take 50 mg by mouth       Melatonin-Pyridoxine (MELATIN PO) Take 3 mg by mouth       nystatin (MYCOSTATIN) cream Apply topically 2 times daily       PRIMIDONE PO Take 250 mg by mouth       SODIUM BICARBONATE PO Take 650 mg by mouth       Levothyroxine Sodium (SYNTHROID PO) Take 100 mcg by mouth       TURMERIC PO Take 1,000 mg by mouth       Venlafaxine HCl (EFFEXOR PO) Take by mouth 3 times daily       venlafaxine (EFFEXOR XR) 150 MG 24 hr capsule Take by mouth daily       vitamin D (ERGOCALCIFEROL) 91166 UNIT capsule Take 50,000 Units by mouth       Pyridoxine HCl (VITAMIN B6 PO) Take 100 mg by mouth       zinc 50 MG TABS        FLUCONAZOLE PO Take 150 mg by mouth          Allergies   Allergen Reactions     Mirtazapine Unknown     Adhesive Tape Rash     Bupropion Unknown     Ciprofloxacin Rash     Codeine Unknown, Nausea and Vomiting and Rash     Tolterodine Other (See Comments)     Ace Inhibitors Unknown and Other (See Comments)     No Clinical Screening - See Comments Swelling     In high school      Cyclobenzaprine Unknown and Other (See Comments)         Review of Systems:  -As per HPI  -Constitutional: The patient denies fatigue, fevers, chills, unintended weight loss, and night sweats.  -HEENT: Patient denies nonhealing oral sores.  -Skin: As above in HPI. No additional skin concerns.    Physical exam:  Vitals: There were no vitals taken for this visit.  GEN: This is a well developed, well-nourished female in no acute distress, in a pleasant mood.    SKIN: Focused examination of the face, neck, abdomen, back, buttocks, b/l UEs and b/l LEs was performed.  - FP I/II  - lichenified, erythematous, somewhat eczematous appearing pink thin plaques with symmetric distribution involving gluteal cleft, medial buttocks, lower back; similar lesions on upper and lower abdomen  - excoriated eroded papules on right lateral ankle, right forearm.   - splinter hemorrhages of nails  -There are waxy stuck on tan to brown papules on the trunk.  - No other lesions of concern on areas examined.     Impression/Plan:  1. Psoriasis (chronic pruritic eruption, previously dx'ed as LSC, but with most recent bx favoring psoriasis as most likely)  Pt with longstanding pruritic skin eruption for past 5 years. Previously unclear diagnosis. Recent biopsy at last visit most c/w psoriasis. Had previously suspected this to be a lichenified chronic eczematous dermatitis and cannot fully rule this out at this time. Pt has previously tried phototherapy and MTX without adequate response. Discussion with patient today regarding targeted treatment for psoriasis with humira. Discussed risks and benefits with patient. Patient agreeable and would like to start. Will plan to check surveillance labs and pending outcome begin humira.   - plan to begin humira 40 mg q 2 weeks (begin with 80 mg loading dose)  - will check CBC with diff, CMP, hep B and C, and quant gold today  - continue topicals as before  - plan to see back in 3 months to assess  response    Follow-up in 3 months, earlier for new or changing lesions.     Dr. Garcia staffed the patient.    Staff Involved:  Resident(Royer Phillips)/Staff(as above)    I have seen and examined this patient and agree with the assessment and plan as documented in the resident's note.    Myles Garcia MD  Dermatology Attending

## 2018-04-17 NOTE — PATIENT INSTRUCTIONS
We believe you most likely have psoriasis as your biopsy was most consistent with this finding.  We will plan to target your treatment for this with an injectable drug called Humira. Prior to starting this we will have you get routine blood work done.   Regarding the Humira, we will have you start with 80 mg followed by 40 mg injections every two weeks.  We will see you back in 3 months to see how you have responded.

## 2018-04-17 NOTE — MR AVS SNAPSHOT
After Visit Summary   4/17/2018    Annette Longo    MRN: [id number]           Patient Information     Date Of Birth          1950        Visit Information        Provider Department      4/17/2018 11:00 AM Myles Garcia MD [facility]        Today's Diagnoses     Psoriasis    -  1       Follow-ups after your visit        Your next 10 appointments already scheduled     Apr 17, 2018 12:45 PM CDT   LAB with  LAB   [facility] ([facility])    [address] [place] [id number] [phone]           Please do not eat 10-12 hours before your appointment if you are coming in fasting for labs on lipids, cholesterol, or glucose (sugar). This does not apply to pregnant women. Water, hot tea and black coffee (with nothing added) are okay. Do not drink other fluids, diet soda or chew gum.            Jul 31, 2018 10:45 AM CDT   (Arrive by 10:30 AM)   Return Visit with Myles Garcia MD   [facility] ([facility])    [address] [place] [id number] [phone]              Future tests that were ordered for you today     Open Future Orders        Priority Expected Expires Ordered    CBC with platelets differential Routine  4/17/2019 4/17/2018    Comprehensive metabolic panel Routine  4/17/2019 4/17/2018    Hepatitis B surface antigen Routine  4/17/2019 4/17/2018    Hepatitis C Screen Reflex to HCV RNA Quant and Genotype Routine  4/18/2019 4/17/2018            Who to contact     Please call your clinic at [phone] to:    Ask questions about your health    Make or cancel appointments    Discuss your medicines    Learn about your test results    Speak to your doctor            Additional Information About Your Visit        [employer] Information     [employer] is an electronic gateway that provides easy, online access to your medical records. With [employer], you can request a  clinic appointment, read your test results, renew a prescription or communicate with your care team.     To sign up for TEXbasehart visit the website at [email or web address]   You will be asked to enter the access code listed below, as well as some personal information. Please follow the directions to create your username and password.     Your access code is: [email or web address]  Expires: 2018  7:30 AM     Your access code will  in 90 days. If you need help or a new code, please contact your [facility] or call [phone] for assistance.        Care EveryWhere ID     This is your Care EveryWhere ID. This could be used by other organizations to access your [employer] medical records  [id number]         Blood Pressure from Last 3 Encounters:   No data found for BP    Weight from Last 3 Encounters:   No data found for Wt              We Performed the Following     M Tuberculosis by Quantiferon          Today's Medication Changes          These changes are accurate as of 18 12:28 PM.  If you have any questions, ask your nurse or doctor.               Start taking these medicines.        Dose/Directions    adalimumab 40 MG/0.8ML prefilled syringe kit   Commonly known as:  HUMIRA   Used for:  Psoriasis   Started by:  Myles Garcia MD        Dose:  40 mg   Inject 0.8 mLs (40 mg) Subcutaneous every 14 days   Quantity:  4 Syringe   Refills:  1            Where to get your medicines      Call your pharmacy to confirm that your medication is ready for pickup. It may take up to 24 hours for them to receive the prescription. If the prescription is not ready within 3 business days, please contact your clinic or your provider.     We will let you know when these medications are ready. If you don't hear back within 3 business days, please contact us.     adalimumab 40 MG/0.8ML prefilled syringe kit                Primary Care Provider Office Phone # Fax #    Stewart Murphy MD  [phone]       [facility] [address]            [place] [address]        Equal Access to Services     JACOB RUBI : Brie aad ku hadeli Kraft, wascarlet yovanyallegra, breann kaprosperda truman, scarlet buckley. So [facility] [phone].    ATENCIÓN: Si habla español, tiene a jefferson disposición servicios gratuitos de asistencia lingüística. Llame al [phone].    We comply with applicable federal civil rights laws and [place] laws. We do not discriminate on the basis of race, color, national origin, age, disability, sex, sexual orientation, or gender identity.            Thank you!     Thank you for choosing [facility]  for your care. Our goal is always to provide you with excellent care. Hearing back from our patients is one way we can continue to improve our services. Please take a few minutes to complete the written survey that you may receive in the mail after your visit with us. Thank you!             Your Updated Medication List - Protect others around you: Learn how to safely use, store and throw away your medicines at www.disposemymeds.org.          This list is accurate as of 4/17/18 12:28 PM.  Always use your most recent med list.                   Brand Name Dispense Instructions for use Diagnosis    adalimumab 40 MG/0.8ML prefilled syringe kit    HUMIRA    4 Syringe    Inject 0.8 mLs (40 mg) Subcutaneous every 14 days    Psoriasis       amLODIPine 5 MG tablet    NORVASC     Take 5 mg by mouth daily        CALCIUM MAGNESIUM PO      Take 600 mg by mouth        CEPHALEXIN PO      Take 500 mg by mouth        CHLORTHALIDONE PO      Take 25 mg by mouth        CLOPIDOGREL BISULFATE PO      Take 75 mg by mouth        * EFFEXOR PO      Take by mouth 3 times daily        * EFFEXOR  MG 24 hr capsule   Generic drug:  venlafaxine      Take by mouth daily        FLUCONAZOLE PO      Take 150 mg by mouth        FOLIC ACID PO      Take  800 mcg by mouth daily        HUMULIN N SC           hydrALAZINE 50 MG tablet    APRESOLINE     Take 50 mg by mouth 3 times daily        ISOSORBIDE DINITRATE PO      Take 10 mg by mouth        LIPITOR PO           Lysine 1000 MG Tabs           MELATIN PO      Take 3 mg by mouth        METOPROLOL SUCCINATE ER PO      Take 50 mg by mouth        nystatin cream    MYCOSTATIN     Apply topically 2 times daily        PRIMIDONE PO      Take 250 mg by mouth        SODIUM BICARBONATE PO      Take 650 mg by mouth        SYNTHROID PO      Take 100 mcg by mouth        triamcinolone 0.1 % ointment    KENALOG    80 g    Mix entire tube with 16ounce jar of moisturizer, apply at bedtime    Lichen simplex chronicus       TURMERIC PO      Take 1,000 mg by mouth        VITAMIN B6 PO      Take 100 mg by mouth        vitamin D 45172 UNIT capsule    ERGOCALCIFEROL     Take 50,000 Units by mouth        zinc 50 MG Tabs           * Notice:  This list has 2 medication(s) that are the same as other medications prescribed for you. Read the directions carefully, and ask your doctor or other care provider to review them with you.

## 2018-04-17 NOTE — NURSING NOTE
Dermatology Rooming Note    Annette Longo's goals for this visit include:   Chief Complaint   Patient presents with     Derm Problem     Rash follow up, Annette does not note improvement.     Karo Infante LPN

## 2018-04-18 NOTE — TELEPHONE ENCOUNTER
Prior Authorization Approval    Authorization Effective Date: 4/17/2018  Authorization Expiration Date: 4/17/2020  Medication: adalimumab (Humira)   Approved Dose/Quantity:   Reference #: [id number]   Insurance Company: [employer] [phone] Fax [phone]  Expected CoPay:       CoPay Card Available: Yes   Foundation Assistance Needed:    Which Pharmacy is filling the prescription (Not needed for infusion/clinic administered): [facility] [address]  Pharmacy Notified: Yes  Patient Notified:

## 2018-04-19 LAB
M TB TUBERC IFN-G BLD QL: NEGATIVE
M TB TUBERC IFN-G/MITOGEN IGNF BLD: 0.01 IU/ML

## 2018-04-22 PROBLEM — L40.9 PSORIASIS: Status: ACTIVE | Noted: 2018-04-22

## 2018-04-23 ENCOUNTER — TELEPHONE (OUTPATIENT)
Dept: DERMATOLOGY | Facility: CLINIC | Age: 68
End: 2018-04-23

## 2018-04-23 NOTE — TELEPHONE ENCOUNTER
[facility] specialty pharmacy would like to know if patient had TB test done to start Humira.  Informed them that is was negative and that all safety labs are normal. All questions answered.     Melania Simpson RN

## 2018-04-23 NOTE — TELEPHONE ENCOUNTER
Please call the pt in the morning before 2pm as she will be unavailable after that.   Pt called with concerns of her psoriasis and a recent medication that ordered that is not working  (triamcinolone ointment) States that she has been on this before and it does not work.  Pt states she is very upset and would like to talk to the doctor about her condition. PT would not give writer anymore information, and stated she would like to talk tot he doctor. Pt would like a phone call to discuss all of her questions and concerns. This message was sent to provider and staff to review and call pt to discuss.

## 2018-04-24 NOTE — TELEPHONE ENCOUNTER
Spoke with Annette and she notes that she does not want to use the triamcinolone. She will wait for her Humira to be delivered to her.

## 2018-06-20 DIAGNOSIS — L40.9 PSORIASIS: ICD-10-CM

## 2018-06-20 NOTE — TELEPHONE ENCOUNTER
adalimumab (HUMIRA) 40 MG/0.8ML prefilled syringe kit  Last Written Prescription Date:  4/17/18  Last Fill Quantity: 4 syringe,   # refills: 1  Last Office Visit :4/17/18  Future Office visit:  7/31/18     plan to begin humira 40 mg q 2 weeks (begin with 80 mg loading dose)    Routing refill request to provider for review/approval because:  Not on protocol

## 2018-06-21 NOTE — TELEPHONE ENCOUNTER
Received refill request for humira as the resident on call. Reviewed patient's chart and attached communication. Patient last seen 4/17/18 for psoriasis. After reviewing the assessment and plan from last visit, the refill request was accepted.      Bhavna Pelaez MD  PGY-3 -Internal Medicine - Dermatology Resident

## 2018-10-09 DIAGNOSIS — L40.9 PSORIASIS: ICD-10-CM

## 2018-10-09 NOTE — TELEPHONE ENCOUNTER
adalimumab (HUMIRA) 40 MG/0.8ML prefilled syringe kit      Last Written Prescription Date:  6/21/18  Last Fill Quantity: 4 syringe,   # refills: 1  Last Office Visit : 4/17/18  Future Office visit:  10/18/18    Routing refill request to provider for review/approval because:  Drug not on the Derm refill protocol.    Plan at last visit 4/17/18:   Psoriasis (chronic pruritic eruption, previously dx'ed as LSC, but with most recent bx favoring psoriasis as most likely)  Pt with longstanding pruritic skin eruption for past 5 years. Previously unclear diagnosis. Recent biopsy at last visit most c/w psoriasis. Had previously suspected this to be a lichenified chronic eczematous dermatitis and cannot fully rule this out at this time. Pt has previously tried phototherapy and MTX without adequate response. Discussion with patient today regarding targeted treatment for psoriasis with humira. Discussed risks and benefits with patient. Patient agreeable and would like to start. Will plan to check surveillance labs and pending outcome begin humira.   - plan to begin humira 40 mg q 2 weeks (begin with 80 mg loading dose)  - will check CBC with diff, CMP, hep B and C, and quant gold today  - continue topicals as before  - plan to see back in 3 months to assess response       RTC:  Follow-up in 3 months *pending appt 10/18/18

## 2018-10-12 NOTE — TELEPHONE ENCOUNTER
Received refill request for humira as the resident on call. Reviewed patient's chart and attached communication. Patient last seen 4/17/18 for psoriasis. After reviewing the assessment and plan from last visit, the refill request was accepted.

## 2018-10-18 ENCOUNTER — OFFICE VISIT (OUTPATIENT)
Dept: DERMATOLOGY | Facility: CLINIC | Age: 68
End: 2018-10-18
Payer: COMMERCIAL

## 2018-10-18 DIAGNOSIS — L40.9 PSORIASIS: ICD-10-CM

## 2018-10-18 DIAGNOSIS — Z79.899 ENCOUNTER FOR LONG-TERM CURRENT USE OF HIGH RISK MEDICATION: Primary | ICD-10-CM

## 2018-10-18 ASSESSMENT — PAIN SCALES - GENERAL: PAINLEVEL: NO PAIN (0)

## 2018-10-18 NOTE — MR AVS SNAPSHOT
After Visit Summary   10/18/2018    Annette Longo    MRN: [id number]           Patient Information     Date Of Birth          1950        Visit Information        Provider Department      10/18/2018 12:30 PM Myles Garcia MD [facility]        Today's Diagnoses     Psoriasis          Care Instructions    You look great on examination today!     We will get labs prior to your next visit    Follow-up in 6 months          Follow-ups after your visit        Follow-up notes from your care team     Return in about 6 months (around 4/18/2019).      Who to contact     Please call your clinic at [phone] to:    Ask questions about your health    Make or cancel appointments    Discuss your medicines    Learn about your test results    Speak to your doctor            Additional Information About Your Visit        Care EveryWhere ID     This is your Care EveryWhere ID. This could be used by other organizations to access your [place] medical records  [id number]         Blood Pressure from Last 3 Encounters:   No data found for BP    Weight from Last 3 Encounters:   No data found for Wt              Today, you had the following     No orders found for display       Primary Care Provider Office Phone # Fax #    Stewart Murphy [phone]       [facility] [address]  [facility] [address]        Equal Access to Services     [facility] : Hadii aad ku hadasho Soomaali, waaxda luqadaha, qaybta kaalmada adeegyada, scarlet barrera lagwendolyn buckley. So [facility] [phone].    ATENCIÓN: Si habla español, tiene a jefferson disposición servicios gratuitos de asistencia lingüística. Llame al [phone].    We comply with applicable federal civil rights laws and [place] laws. We do not discriminate on the basis of race, color, national origin, age, disability, sex, sexual orientation, or gender identity.            Thank you!     Thank you  for choosing [facility]ERMAT[facility]  for your care. Our goal is always to provide you with excellent care. Hearing back from our patients is one way we can continue to improve our services. Please take a few minutes to complete the written survey that you may receive in the mail after your visit with us. Thank you!             Your Updated Medication List - Protect others around you: Learn how to safely use, store and throw away your medicines at www.disposemymeds.org.          This list is accurate as of 10/18/18  1:45 PM.  Always use your most recent med list.                   Brand Name Dispense Instructions for use Diagnosis    adalimumab 40 MG/0.8ML prefilled syringe kit    HUMIRA    4 Syringe    Inject 0.8 mLs (40 mg) Subcutaneous every 14 days    Psoriasis       amLODIPine 5 MG tablet    NORVASC     Take 5 mg by mouth daily        CALCIUM MAGNESIUM PO      Take 600 mg by mouth        CEPHALEXIN PO      Take 500 mg by mouth        CHLORTHALIDONE PO      Take 25 mg by mouth        CLOPIDOGREL BISULFATE PO      Take 75 mg by mouth        * EFFEXOR PO      Take by mouth 3 times daily        * EFFEXOR  MG 24 hr capsule   Generic drug:  venlafaxine      Take by mouth daily        FLUCONAZOLE PO      Take 150 mg by mouth        FOLIC ACID PO      Take 800 mcg by mouth daily        HUMULIN N SC           hydrALAZINE 50 MG tablet    APRESOLINE     Take 50 mg by mouth 3 times daily        ISOSORBIDE DINITRATE PO      Take 10 mg by mouth        LIPITOR PO           Lysine 1000 MG Tabs           MELATIN PO      Take 3 mg by mouth        METOPROLOL SUCCINATE ER PO      Take 50 mg by mouth        nystatin cream    MYCOSTATIN     Apply topically 2 times daily        PRIMIDONE PO      Take 250 mg by mouth        SODIUM BICARBONATE PO      Take 650 mg by mouth        SYNTHROID PO      Take 100 mcg by mouth        triamcinolone 0.1 % ointment    KENALOG    80 g    Mix entire tube with 16ounce jar of moisturizer, apply at  bedtime    Lichen simplex chronicus       TURMERIC PO      Take 1,000 mg by mouth        VITAMIN B6 PO      Take 100 mg by mouth        vitamin D 43584 UNIT capsule    ERGOCALCIFEROL     Take 50,000 Units by mouth        zinc 50 MG Tabs           * Notice:  This list has 2 medication(s) that are the same as other medications prescribed for you. Read the directions carefully, and ask your doctor or other care provider to review them with you.

## 2018-10-18 NOTE — LETTER
"10/18/2018       RE: Annette Longo  [address] [facility] [facility] [address]     Dear Colleague,    Thank you for referring your patient, Annette Longo, to the [facility] DERMAT[facility] at [facility]. Please see a copy of my visit note below.    [facility] Derm[facility]ology Note      Dermatology Problem List:  1. Psoriasis  - Biopsy 3/22/2018 most consistent with psoriasis  - Previous therapies: methotrexate, phototherapy with some improvement but not adequate control  - Laboratory monitoring: Last checked 4/17/2018 (CBC, CMP, Hep panel, Quant gold) - WNL except for elevated creatinine, patient has know CKD stage 3 followed by nephrology  - Current regimen: Humira 40 mg q2 weeks (started 4/2018)    Encounter Date: Oct 18, 2018    CC:  Chief Complaint   Patient presents with     Derm Problem     Psoriasis - \"It's pretty much cleared up.\"         History of Present Illness:  Ms. Annette Longo is a 68 year old female who presents as a follow-up for psoriasis. The patient was last seen 4/17/2018 when she was started on Humira.  Previously she had been treated at an outside clinic as chronic eczematous derm/ LSC, but a biopsy at our clinic was most consistent with psoriasis.    Today, the patient reports clearance of her psoriasis and itch. Reports that it is also helping with her rheumatoid arthritis. She is very happy with her current control on Humira 40 mg q2 weeks. Does not have any new areas of involvement.  Patient denies injection site reactions or new infections. No new joint pain or swelling.    No other skin complaints.    Past Medical History:   Patient Active Problem List   Diagnosis     Rash     Lichen simplex chronicus     Psoriasis     No past medical history on file.  No past surgical history on file.    Social History:   reports that she has never smoked. She has never used smokeless tobacco.    Family History:  Family History   Problem " Relation Age of Onset     Skin Cancer Sister        Medications:  Current Outpatient Prescriptions   Medication Sig Dispense Refill     adalimumab (HUMIRA) 40 MG/0.8ML prefilled syringe kit Inject 0.8 mLs (40 mg) Subcutaneous every 14 days 4 Syringe 0     amLODIPine (NORVASC) 5 MG tablet Take 5 mg by mouth daily       Atorvastatin Calcium (LIPITOR PO)        Calcium-Magnesium-Vitamin D (CALCIUM MAGNESIUM PO) Take 600 mg by mouth       CEPHALEXIN PO Take 500 mg by mouth       CHLORTHALIDONE PO Take 25 mg by mouth       CLOPIDOGREL BISULFATE PO Take 75 mg by mouth       FLUCONAZOLE PO Take 150 mg by mouth       FOLIC ACID PO Take 800 mcg by mouth daily       hydrALAZINE (APRESOLINE) 50 MG tablet Take 50 mg by mouth 3 times daily       Insulin NPH Human, Isophane, (HUMULIN N SC)        ISOSORBIDE DINITRATE PO Take 10 mg by mouth       Levothyroxine Sodium (SYNTHROID PO) Take 100 mcg by mouth       Lysine 1000 MG TABS        Melatonin-Pyridoxine (MELATIN PO) Take 3 mg by mouth       METOPROLOL SUCCINATE ER PO Take 50 mg by mouth       nystatin (MYCOSTATIN) cream Apply topically 2 times daily       PRIMIDONE PO Take 250 mg by mouth       Pyridoxine HCl (VITAMIN B6 PO) Take 100 mg by mouth       SODIUM BICARBONATE PO Take 650 mg by mouth       triamcinolone (KENALOG) 0.1 % ointment Mix entire tube with 16ounce jar of moisturizer, apply at bedtime 80 g 4     TURMERIC PO Take 1,000 mg by mouth       venlafaxine (EFFEXOR XR) 150 MG 24 hr capsule Take by mouth daily       Venlafaxine HCl (EFFEXOR PO) Take by mouth 3 times daily       vitamin D (ERGOCALCIFEROL) 90352 UNIT capsule Take 50,000 Units by mouth       zinc 50 MG TABS        Allergies   Allergen Reactions     Mirtazapine Unknown     Adhesive Tape Rash     Bupropion Unknown     Ciprofloxacin Rash     Codeine Unknown, Nausea and Vomiting and Rash     Tolterodine Other (See Comments)     Ace Inhibitors Unknown and Other (See Comments)     No Clinical Screening - See  Comments Swelling     In high school     Cyclobenzaprine Unknown and Other (See Comments)         Review of Systems:  -As per HPI  -Constitutional: The patient denies fevers, chills, unintended weight loss, and night sweats.  -HEENT: Patient denies nonhealing oral sores.  -Psychiatric: Answers for HPI/ROS submitted by the patient on 10/18/2018   PHQ-2 Score: 2  -Skin: As above in HPI. No additional skin concerns.  10 point ROS otherwise negative    Physical exam:  Vitals: There were no vitals taken for this visit.  GEN: This is a well developed, well-nourished female in no acute distress, in a pleasant mood.    SKIN: Focused examination of the face, back, and buttocks was performed.  Faint pink macular erythema on buttocks, most consistent with post-inflammatory change.  No active psoriatic plaques.  -No other lesions of concern on areas examined.       Impression/Plan:  1. Psoriasis - Well-controlled on Humira 40 mg q2 weeks. BSA: 0%. Patient is very happy with current control.    Continue Humira 40 mg q2 weeks    Patient will be due for safety labs at follow-up in 6 months       Follow-up in 6 months, earlier for new or changing lesions.     Staff Involved:  Scribed by Manjinder Caicedo MS4 for Dr. Garcia     The medical student acted as a scribe with respect to this patient.  I have performed all the key elements of the history and physical.    Myles Garcia MD  Dermatology Attending

## 2018-10-18 NOTE — NURSING NOTE
"Dermatology Rooming Note    Annette Longo's goals for this visit include:   Chief Complaint   Patient presents with     Derm Problem     Psoriasis - \"It's pretty much cleared up.\"     Nohemi Mak, CMA  "

## 2018-10-18 NOTE — PATIENT INSTRUCTIONS
You look great on examination today!     We will get labs prior to your next visit    Follow-up in 6 months

## 2018-10-18 NOTE — PROGRESS NOTES
"[facility] Dermatology Note      Dermatology Problem List:  1. Psoriasis  - Biopsy 3/22/2018 most consistent with psoriasis  - Previous therapies: methotrexate, phototherapy with some improvement but not adequate control  - Laboratory monitoring: Last checked 4/17/2018 (CBC, CMP, Hep panel, Quant gold) - WNL except for elevated creatinine, patient has know CKD stage 3 followed by nephrology  - Current regimen: Humira 40 mg q2 weeks (started 4/2018)    Encounter Date: Oct 18, 2018    CC:  Chief Complaint   Patient presents with     Derm Problem     Psoriasis - \"It's pretty much cleared up.\"         History of Present Illness:  Ms. Annette Longo is a 68 year old female who presents as a follow-up for psoriasis. The patient was last seen 4/17/2018 when she was started on Humira.  Previously she had been treated at an outside clinic as chronic eczematous derm/ LSC, but a biopsy at our clinic was most consistent with psoriasis.    Today, the patient reports clearance of her psoriasis and itch. Reports that it is also helping with her rheumatoid arthritis. She is very happy with her current control on Humira 40 mg q2 weeks. Does not have any new areas of involvement.  Patient denies injection site reactions or new infections. No new joint pain or swelling.    No other skin complaints.    Past Medical History:   Patient Active Problem List   Diagnosis     Rash     Lichen simplex chronicus     Psoriasis     No past medical history on file.  No past surgical history on file.    Social History:   reports that she has never smoked. She has never used smokeless tobacco.    Family History:  Family History   Problem Relation Age of Onset     Skin Cancer Sister        Medications:  Current Outpatient Prescriptions   Medication Sig Dispense Refill     adalimumab (HUMIRA) 40 MG/0.8ML prefilled syringe kit Inject 0.8 mLs (40 mg) Subcutaneous every 14 days 4 Syringe 0     amLODIPine (NORVASC) 5 MG tablet Take " 5 mg by mouth daily       Atorvastatin Calcium (LIPITOR PO)        Calcium-Magnesium-Vitamin D (CALCIUM MAGNESIUM PO) Take 600 mg by mouth       CEPHALEXIN PO Take 500 mg by mouth       CHLORTHALIDONE PO Take 25 mg by mouth       CLOPIDOGREL BISULFATE PO Take 75 mg by mouth       FLUCONAZOLE PO Take 150 mg by mouth       FOLIC ACID PO Take 800 mcg by mouth daily       hydrALAZINE (APRESOLINE) 50 MG tablet Take 50 mg by mouth 3 times daily       Insulin NPH Human, Isophane, (HUMULIN N SC)        ISOSORBIDE DINITRATE PO Take 10 mg by mouth       Levothyroxine Sodium (SYNTHROID PO) Take 100 mcg by mouth       Lysine 1000 MG TABS        Melatonin-Pyridoxine (MELATIN PO) Take 3 mg by mouth       METOPROLOL SUCCINATE ER PO Take 50 mg by mouth       nystatin (MYCOSTATIN) cream Apply topically 2 times daily       PRIMIDONE PO Take 250 mg by mouth       Pyridoxine HCl (VITAMIN B6 PO) Take 100 mg by mouth       SODIUM BICARBONATE PO Take 650 mg by mouth       triamcinolone (KENALOG) 0.1 % ointment Mix entire tube with 16ounce jar of moisturizer, apply at bedtime 80 g 4     TURMERIC PO Take 1,000 mg by mouth       venlafaxine (EFFEXOR XR) 150 MG 24 hr capsule Take by mouth daily       Venlafaxine HCl (EFFEXOR PO) Take by mouth 3 times daily       vitamin D (ERGOCALCIFEROL) 36841 UNIT capsule Take 50,000 Units by mouth       zinc 50 MG TABS        Allergies   Allergen Reactions     Mirtazapine Unknown     Adhesive Tape Rash     Bupropion Unknown     Ciprofloxacin Rash     Codeine Unknown, Nausea and Vomiting and Rash     Tolterodine Other (See Comments)     Ace Inhibitors Unknown and Other (See Comments)     No Clinical Screening - See Comments Swelling     In high school     Cyclobenzaprine Unknown and Other (See Comments)         Review of Systems:  -As per HPI  -Constitutional: The patient denies fevers, chills, unintended weight loss, and night sweats.  -HEENT: Patient denies nonhealing oral sores.  -Psychiatric: Answers  for HPI/ROS submitted by the patient on 10/18/2018   PHQ-2 Score: 2  -Skin: As above in HPI. No additional skin concerns.  10 point ROS otherwise negative    Physical exam:  Vitals: There were no vitals taken for this visit.  GEN: This is a well developed, well-nourished female in no acute distress, in a pleasant mood.    SKIN: Focused examination of the face, back, and buttocks was performed.  Faint pink macular erythema on buttocks, most consistent with post-inflammatory change.  No active psoriatic plaques.  -No other lesions of concern on areas examined.       Impression/Plan:  1. Psoriasis - Well-controlled on Humira 40 mg q2 weeks. BSA: 0%. Patient is very happy with current control.    Continue Humira 40 mg q2 weeks    Patient will be due for safety labs at follow-up in 6 months       Follow-up in 6 months, earlier for new or changing lesions.     Staff Involved:  Scribed by Manjinder Caicedo MS4 for Dr. Garcia     The medical student acted as a scribe with respect to this patient.  I have performed all the key elements of the history and physical.    Myles Garcia MD  Dermatology Attending

## 2018-10-28 PROBLEM — Z79.899 ENCOUNTER FOR LONG-TERM CURRENT USE OF HIGH RISK MEDICATION: Status: ACTIVE | Noted: 2018-10-28

## 2019-02-13 ENCOUNTER — TELEPHONE (OUTPATIENT)
Dept: DERMATOLOGY | Facility: CLINIC | Age: 69
End: 2019-02-13

## 2019-02-13 NOTE — TELEPHONE ENCOUNTER
Spoke to PATEL Escalante with [place] (ph# [phone]) - gave requested info on pt as much as I was able within my scope of practice (provided med allergies, social history, diagnosis, etc.). Assume Humira use is unrelated to pt's back pain/ recent back surgery.

## 2019-07-01 DIAGNOSIS — L40.9 PSORIASIS: ICD-10-CM

## 2019-07-01 NOTE — TELEPHONE ENCOUNTER
Health Call Center    Phone Message    May a detailed message be left on voicemail: no    Reason for Call: Medication Refill Request    Has the patient contacted the pharmacy for the refill? Yes   Name of medication being requested: adalimumab (HUMIRA) 40 MG/0.8ML prefilled syringe kit  Provider who prescribed the medication: Dr. Garcia  Pharmacy: [facility]  Date medication is needed: ASAP, Pt is OUT  Note previous documentation about humira, not sure if this can be refilled by Dr. Garcia    Action Taken: Message routed to:  [facility]

## 2019-07-01 NOTE — TELEPHONE ENCOUNTER
adalimumab (HUMIRA) 40 MG/0.8ML prefilled syringe kit      Last Written Prescription Date:  10-18-19  Last Fill Quantity: 4 syg,   # refills: 3  Last Office Visit : 10-18-18  Future Office visit:  none    Routing refill request to provider for review/approval because:  Drug not on the [facility], [facility] or [facility] refill protocol .      Kathleen M Doege RN

## 2019-07-02 ENCOUNTER — TELEPHONE (OUTPATIENT)
Dept: DERMATOLOGY | Facility: CLINIC | Age: 69
End: 2019-07-02

## 2019-07-02 NOTE — TELEPHONE ENCOUNTER
Received refill request for Humira. Reviewed patient's chart and attached communication. Patient last seen 10/2018 for psoriasis. RTC 6 months, which was never scheduled. After reviewing the medication list and assessment and plan from last visit, the refill request was accepted for a 1 month supply. She will need a follow up visit and labs prior to any more refills being granted. Routed to scheduling team.    The patient's information will be forwarded to the scheduling pool for return visit as planned at prior visit.    Yvrose Ayala MD  Dermatology PGY4

## 2019-07-09 ENCOUNTER — TELEPHONE (OUTPATIENT)
Dept: DERMATOLOGY | Facility: CLINIC | Age: 69
End: 2019-07-09

## 2019-07-09 NOTE — TELEPHONE ENCOUNTER
"SPoke with pt states her condition has \"cleared up\" declined to schedule an apt with dr. Myles Garcia   "

## 2019-08-15 DIAGNOSIS — L40.9 PSORIASIS: ICD-10-CM

## 2019-08-15 NOTE — TELEPHONE ENCOUNTER
[facility] Call Center    Phone Message    May a detailed message be left on voicemail: yes    Reason for Call: Medication Refill Request    Has the patient contacted the pharmacy for the refill? Yes   Name of medication being requested: adalimumab (HUMIRA) 40 MG/0.8ML prefilled syringe kit  Provider who prescribed the medication: Dr. Garcia  Pharmacy: [facility]  Date medication is needed: as soon as possible, they stated they would take a verbal         Action Taken: Message routed to:  [facility]

## 2019-08-15 NOTE — TELEPHONE ENCOUNTER
Medication refill request received and reviewed. Patient requesting refill of humira.  Last visit from 2018 was reviewed. At that point, plan was to continue on same. Patient needs recheck of labs for this medicine, but will refill until follow up next month .      Jessica Frausto  PGY-3 Dermatology Resident  [facility] Department of Dermatology   [phone]

## 2019-08-15 NOTE — TELEPHONE ENCOUNTER
adalimumab (HUMIRA) 40 MG/0.8ML prefilled syringe kit      Last Written Prescription Date:  7-2-19  Last Fill Quantity: 2 syr,   # refills: 0  Last Office Visit : 10-18-18  Future Office visit:  9-12-19    Routing refill request to provider for review/approval because:  Not on protocol.      Kathleen M Doege RN

## 2019-09-04 NOTE — PROGRESS NOTES
SUBJECTIVE/OBJECTIVE:                           Annette Longo is a 69 year old female called for an initial visit for Medication Therapy Management.  She was referred to me from [facility] nurse .    Chief Complaint: Pt was referred by Magy NAILS Registered nurse  from [facility]. Per email send from the [facility] coordinator pt was referred for the following reasons.     - She likes to know if some of her medicines come from [place] because she says that medicines that come from [place] can cause cancer. She heard this on the TV. She watched a 60 minute special that said the [place] is going to start buying meds from [place] and she doesn't want to take meds that come from [place].         Allergies/ADRs: Reviewed in Epic  Tobacco: No tobacco use  Alcohol: not currently using  Caffeine: no caffeine  Activity: limited  PMH: Per patient she has 3 different stents placed with the last one placed 5 years ago - family heart history. Pt has an ostomy bag due to perforated colon during colonoscopy.    PCP with [facility]: Dr. Stewart Murphy  Cardiologist: Dr. Sue at [facility]  Endocrinologist: Dr. Rodney Osman - next visit Dec 9th  Nephrologist: Dr. [facility] in [place] - Dec 20th  Pain Clinic: Cant think of name but its in [facility] clinic: Dr. Thompson    Medication Adherence/Access:  no issues reported    Psoriasis:  Current medications include: Humira 40 mg every 14 days.  Seeing Dr. Jose christopher the [facility]. Working with no side effects reported by pt.    Hypothyroidism: Patient is taking Synthroid 100 mcg daily. Patient is having the following symptoms: hypothyroidism -  cold intolerance, dry skin, fatigue, weight gain and constipation.   No results found for: TSH - pt unsure of last level. PCP monitors this. Last care everywhere was 2.37 on 8/8/18    Hypertension/CAD: Current medications include isosorbide dinitrate 10 mg three times daily, chlorthalidone  "25 mg once daily, metoprolol ER 50 mg twice daily, hydralazine 50 mg twice daily, amlodipine 5 mg once daily, clopidogrel 75 mg once daily (she reports being on this for at least 5 years).  Patient does not self-monitor BP.  Patient reports no current medication side effects. Pt reports no light headedness or dizziness.    Last Comprehensive Metabolic Panel:  Sodium   Date Value Ref Range Status   04/17/2018 134 133 - 144 mmol/L Final     Potassium   Date Value Ref Range Status   04/17/2018 4.5 3.4 - 5.3 mmol/L Final     Chloride   Date Value Ref Range Status   04/17/2018 102 94 - 109 mmol/L Final     Carbon Dioxide   Date Value Ref Range Status   04/17/2018 25 20 - 32 mmol/L Final     Anion Gap   Date Value Ref Range Status   04/17/2018 6 3 - 14 mmol/L Final     Glucose   Date Value Ref Range Status   04/17/2018 124 (H) 70 - 99 mg/dL Final     Urea Nitrogen   Date Value Ref Range Status   04/17/2018 21 7 - 30 mg/dL Final     Creatinine   Date Value Ref Range Status   04/17/2018 1.33 (H) 0.52 - 1.04 mg/dL Final     GFR Estimate   Date Value Ref Range Status   04/17/2018 40 (L) >60 mL/min/1.7m2 Final     Comment:     Non  GFR Calc     Calcium   Date Value Ref Range Status   04/17/2018 8.7 8.5 - 10.1 mg/dL Final       Hyperlipidemia: Current therapy includes atorvastatin 20 mg once daily and fish oil 2000 mg daily.  Pt reports some pains but hard to tell if from medicine.     Per care everywhere last lipid panel 11/29/17  Total: 139  HDL: 39  LDL: 51  Trigs: 246    Chronic back pain:  Current medications include: oxycodone 10 mg daily as needed (not currently taking because it doesn't work). She feels that this is not working and it makes her \"sick as a dog\".  She reports that her back injury was due to lifting boxes at her last employer which was in 1987.  When she lifted the first box she fell to the ground and reports she didn't have feeling from her waist down.   She has had 4 back surgeries since " the incident. She sees someone at a pain clinic in [place].  Medications she has tried in the past: oxycodone and morphine.  She has not heard of spinal cord stimulation.  She [facility] and she sees Dr. Thompson. She will be seeing him again first part of October.    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 An infection disease doctor prescribed this.  Pt reports no side effects. She reports the medication is working.      Vitamin D deficiency:  Current medications include: vitamin D 50,000 IU once a week.      Last value in Care Everywhere on 11/14/17: 40.8    Tremor:  Current medications include: primidone 250 mg twice daily.  She feels this work very well. She had stopped it for a few weeks because one of her doctors said there was an interaction with her pain meds but her tremor got so bad so she restarted.    Supplements: Currently taking lysine 1000 mg once daily, vitamin B12 1000 mcg daily, vitamin B6 100 mg daily, zinc 50 mcg daily, turmeric 1000 mg daily (not currently using as it had been coming out whole in her ostomy bag), folic acid 800 mcg daily and calcium (+/- vitamin D pt doesn't know) 600 mg twice daily. Denies issues at this time.   Patient is not interested in stopping/changing supplements. .    Today's Vitals: There were no vitals taken for this visit. - phone visit    Last Care Everywhere bp was on 10/11/18: 142/58, pulse 96 O2: 99%    ASSESSMENT:                             Current medications were reviewed today as discussed above.     Medication Adherence: good, no issues identified    Rheumatoid Arthritis/psoriasis:  Stable. Continue to follow with specialist at the [place].    Hypothyroidism: Needs Improvement. Recommend rechecking TSH since pt is having symptoms that would indicate hypothyroidism.    Hypertension/CAD: Needs Improvement. Patient is not meeting BP goal  of < 140/90mmHg, however pt may have different goal set by her provider. I did encourage her to discuss reducing medications with her PCP and cardiologist if her blood pressure is well below goal. Unable to assess as this was a phone visit and I don't have any recent blood pressures.  I also recommended that patient talk with her cardiologist to determine if clopidogrel is still needed 5 years post stent placement.     Hyperlipidemia: Stable. Ensure checking lipid panel and LFT's annually    Chronic back pain:  Needs Improvement.  Recommended that patient discuss with primary care provider and pain specialist regarding possible surgical options (spinal cord stimulation) as well as physical therapy that may improve pain.  I did recommend the patient avoid oxycodone when using primidone due to drug-drug interaction.  See plan.    MRSA history:  Stable    Vitamin D deficiency: Needs improvement.  Recommended that vitamin D level be rechecked to ensure patient's level is not too high.    Tremor:  Stable.  Recommended patient find out if her CBC is being monitored every 6 months since she is on primidone.    Supplements: Stable.  Educated patient that if she is seeing pills coming out of her ostomy bag hole that she should let her doctors know because most likely the medications are not having an effect.  In regards to the turmeric supplement, I suggested that she open the powder capsule and dump in applesauce if she would like to continue taking this.    PLAN:                            1. Ask your spine doctor about spinal cord stimulation. Also ask about other medication options if surgical procedures or physical therapy are not options.     2. Ask Dr. Murphy or Dr. Osman to recheck your TSH to see if in the normal range.    3. Consider switching to [facility].      4. Ask Dr. Osman if you need to have vitamin D rechecked and if you need to continue on the high dose vitamin D.    5. Ask Dr. Sue  if you should still be on clopidogrel since it has been 5 years since your last stent. You could call and ask his nurse.    6. Make sure Dr. Murphy is monitoring your CBC every 6 months when you are on primidone.    7. Ask Dr. Murphy why you are taking folic acid and sodium bicarbonate and if you need to continue both of them.    8. Let your doctors know what pills are coming out of your ostomy bag.    9. Your blood pressure should be lower than 140/90 unless your doctors have told you a different goal. If it is a lot lower than this or you are having lightheadedness or dizziness then I would discuss with your doctors.     10. In looking into the phenobarbital and primidone more I found that primidone is broken down in your body and forms phenobarbital which is active so that is why it showed positive in your urine. There is that interaction between oxycodone and primidone in that it can decrease your respiration rate and make you too sleepy which could be dangerous.  I would not use these together. Discuss this with your pain doctor.    At next visit:  1. Finish CMR    I spent 90 minutes with this patient today. I offer these suggestions with the understanding that I don't fully understand Annette's past medical history and the complexity of her health conditions. Annette should make no changes without the approval of her physician. A copy of the visit note was provided to the patient's primary care and cardiology provider.    Will follow up in 1.5 months after some follow up visits.    The patient was mailed a summary of these recommendations as an after visit summary.     Mary Jane Muhammad, PharmD  Medication Therapy Management Pharmacist    Medications reconciled during this visit.   Current Outpatient Medications   Medication Sig Dispense Refill     adalimumab (HUMIRA) 40 MG/0.8ML prefilled syringe kit Inject 0.8 mLs (40 mg) Subcutaneous every 14 days 2 Syringe 0     amLODIPine (NORVASC) 5 MG tablet Take 5 mg  by mouth daily       Atorvastatin Calcium (LIPITOR PO) Take 20 mg by mouth every morning        Calcium-Magnesium-Vitamin D (CALCIUM MAGNESIUM PO) Take 600 mg by mouth 2 times daily        CEPHALEXIN PO Take 500 mg by mouth daily Due to recurrent MRSA infection       CHLORTHALIDONE PO Take 25 mg by mouth every morning        CLOPIDOGREL BISULFATE PO Take 75 mg by mouth daily        cyanocobalamin (VITAMIN B-12) 1000 MCG tablet Take 1,000 mcg by mouth daily       FOLIC ACID PO Take 800 mcg by mouth daily       hydrALAZINE (APRESOLINE) 50 MG tablet Take 50 mg by mouth 2 times daily        insulin aspart prot & aspart (NOVOLOG MIX 70/30 PEN) (70-30) 100 UNIT/ML pen Inject 30 Units Subcutaneous 2 times daily (with meals)       ISOSORBIDE DINITRATE PO Take 10 mg by mouth 3 times daily        Levothyroxine Sodium (SYNTHROID PO) Take 100 mcg by mouth every morning        Lysine 1000 MG TABS Take 1 tablet by mouth every evening To prevent cold sores       Melatonin 10 MG TABS tablet Take 10 mg by mouth At Bedtime       METOPROLOL SUCCINATE ER PO Take 50 mg by mouth 2 times daily        nystatin (MYCOSTATIN) cream Apply topically 2 times daily       nystatin POWD 1 Dose daily as needed       Omega-3 Fatty Acids (SALMON OIL-1000 PO) Take 2 capsules by mouth daily       oxyCODONE IR (ROXICODONE) 10 MG tablet Take 10 mg by mouth daily as needed for severe pain       PRIMIDONE PO Take 250 mg by mouth 2 times daily        Pyridoxine HCl (VITAMIN B6 PO) Take 100 mg by mouth daily        SODIUM BICARBONATE PO Take 1,300 mg by mouth 3 times daily        triamcinolone (KENALOG) 0.1 % ointment Mix entire tube with 16ounce jar of moisturizer, apply at bedtime 80 g 4     venlafaxine (EFFEXOR XR) 150 MG 24 hr capsule Take 150 mg by mouth daily        vitamin D (ERGOCALCIFEROL) 81703 UNIT capsule Take 50,000 Units by mouth once a week        zinc 50 MG TABS Take 1 tablet by mouth daily        FLUCONAZOLE PO Take 150 mg by mouth once As  needed if one occurs       TURMERIC PO Take 1,000 mg by mouth

## 2019-09-16 ENCOUNTER — ALLIED HEALTH/NURSE VISIT (OUTPATIENT)
Dept: PHARMACY | Facility: CLINIC | Age: 69
End: 2019-09-16
Payer: COMMERCIAL

## 2019-09-16 DIAGNOSIS — L40.9 PSORIASIS: Primary | ICD-10-CM

## 2019-09-16 DIAGNOSIS — M54.9 CHRONIC BACK PAIN: ICD-10-CM

## 2019-09-16 DIAGNOSIS — G89.29 CHRONIC BACK PAIN: ICD-10-CM

## 2019-09-16 DIAGNOSIS — A49.02 MRSA INFECTION: ICD-10-CM

## 2019-09-16 DIAGNOSIS — R25.1 TREMOR: ICD-10-CM

## 2019-09-16 DIAGNOSIS — E78.5 HYPERLIPIDEMIA LDL GOAL <70: ICD-10-CM

## 2019-09-16 DIAGNOSIS — I25.10 CAD (CORONARY ARTERY DISEASE): ICD-10-CM

## 2019-09-16 DIAGNOSIS — E55.9 VITAMIN D DEFICIENCY: ICD-10-CM

## 2019-09-16 DIAGNOSIS — Z78.9 TAKES DIETARY SUPPLEMENTS: ICD-10-CM

## 2019-09-16 DIAGNOSIS — I10 BENIGN ESSENTIAL HYPERTENSION: ICD-10-CM

## 2019-09-16 DIAGNOSIS — E03.9 HYPOTHYROIDISM: ICD-10-CM

## 2019-09-16 PROCEDURE — 99605 MTMS BY PHARM NP 15 MIN: CPT | Performed by: PHARMACIST

## 2019-09-16 PROCEDURE — 99607 MTMS BY PHARM ADDL 15 MIN: CPT | Performed by: PHARMACIST

## 2019-09-16 RX ORDER — LANOLIN ALCOHOL/MO/W.PET/CERES
1000 CREAM (GRAM) TOPICAL DAILY
COMMUNITY

## 2019-09-16 RX ORDER — OXYCODONE HYDROCHLORIDE 10 MG/1
10 TABLET ORAL DAILY PRN
COMMUNITY

## 2019-09-16 RX ORDER — PHENOL 1.4 %
10 AEROSOL, SPRAY (ML) MUCOUS MEMBRANE AT BEDTIME
COMMUNITY

## 2019-09-16 RX ORDER — NYSTATIN 10B UNIT
1 POWDER (EA) MISCELLANEOUS DAILY PRN
COMMUNITY

## 2019-09-16 NOTE — PATIENT INSTRUCTIONS
Recommendations from today's MTM visit:                                                    MTM (medication therapy management) is a service provided by a clinical pharmacist designed to help you get the most of out of your medicines.   Today we reviewed what your medicines are for, how to know if they are working, that your medicines are safe and how to make your medicine regimen as easy as possible.      1. Ask your spine doctor about spinal cord stimulation. Also ask about other medication options if surgical procedures or physical therapy are not options.     2. Ask Dr. Murphy or Dr. Osman to recheck your TSH to see if in the normal range.    3. Consider switching to [facility].      4. Ask Dr. Osman if you need to have vitamin D rechecked and if you need to continue on the high dose vitamin D.    5. Ask Dr. Sue if you should still be on clopidogrel since it has been 5 years since your last stent. You could call and ask his nurse.    6. Make sure Dr. Murphy is monitoring your CBC every 6 months when you are on primidone.    7. Ask Dr. Murphy why you are taking folic acid and sodium bicarbonate and if you need to continue both of them.    8. Let your doctors know what pills are coming out of your ostomy bag.    9. Your blood pressure should be lower than 140/90 unless your doctors have told you a different goal. If it is a lot lower than this or you are having lightheadedness or dizziness then I would discuss with your doctors.     10. In looking into the phenobarbital and primidone more I found that primidone is broken down in your body and forms phenobarbital which is active so that is why it showed positive in your urine. There is that interaction between oxycodone and primidone in that it can decrease your respiration rate and make you too sleepy which could be dangerous.  I would not use these together. Discuss this with your pain doctor.    It was great to speak with you today.  I value  your experience and would be very thankful for your time with providing feedback on our clinic survey. You may receive a survey via email or text message in the next few days.     Next MTM visit: 10/28/19 at 2pm    To schedule another MTM appointment, please call the clinic directly or you may call the MTM scheduling line at [phone] or toll-free at [phone].     My Clinical Pharmacist's contact information:                                                      It was a pleasure talking with you today!  Please feel free to contact me with any questions or concerns you have.      Mary Jane Muhammad, PharmD  Medication Therapy Management Pharmacist  [facility] - Monday and Wednesday 7:30 - 4:00  Phone: [phone] - direct clinic line

## 2019-10-02 ENCOUNTER — TELEPHONE (OUTPATIENT)
Dept: PHARMACY | Facility: CLINIC | Age: 69
End: 2019-10-02

## 2019-10-02 DIAGNOSIS — L40.9 PSORIASIS: ICD-10-CM

## 2019-10-02 NOTE — TELEPHONE ENCOUNTER
Medication requested:  adalimumab (HUMIRA) 40 MG/0.8ML    Last refilled: 8/15/19  Qty: 2 SYR      Last seen: 10/18/18  RTC:  6 MOS  Next appt: 10/31/19    * Routing refill request to provider for review/approval because:  Drug not on the refill protocol    OVER DUE RTC    RF PENDING UNTIL  10/31/19 APPT.

## 2019-10-02 NOTE — TELEPHONE ENCOUNTER
[facility] Call Center    Phone Message    May a detailed message be left on voicemail: yes    Reason for Call: Medication Refill Request    Has the patient contacted the pharmacy for the refill? Yes   Name of medication being requested: adalimumab (HUMIRA) 40 MG/0.8ML prefilled syringe kit  Provider who prescribed the medication: Jessica Frausto  Pharmacy: [facility]  Date medication is needed: asap, Thank you.         Action Taken: Message routed to:  [facility] REFILL TEAM for  Dermatology

## 2019-10-28 ENCOUNTER — ALLIED HEALTH/NURSE VISIT (OUTPATIENT)
Dept: PHARMACY | Facility: CLINIC | Age: 69
End: 2019-10-28
Payer: COMMERCIAL

## 2019-10-28 DIAGNOSIS — E11.9 DIABETES MELLITUS, TYPE 2 (H): ICD-10-CM

## 2019-10-28 DIAGNOSIS — L40.9 PSORIASIS: Primary | ICD-10-CM

## 2019-10-28 DIAGNOSIS — I25.10 CAD (CORONARY ARTERY DISEASE): ICD-10-CM

## 2019-10-28 DIAGNOSIS — G89.29 CHRONIC BACK PAIN: ICD-10-CM

## 2019-10-28 DIAGNOSIS — M54.9 CHRONIC BACK PAIN: ICD-10-CM

## 2019-10-28 DIAGNOSIS — M81.0 OSTEOPOROSIS: ICD-10-CM

## 2019-10-28 DIAGNOSIS — E55.9 VITAMIN D DEFICIENCY: ICD-10-CM

## 2019-10-28 DIAGNOSIS — E03.9 HYPOTHYROIDISM, UNSPECIFIED TYPE: ICD-10-CM

## 2019-10-28 DIAGNOSIS — I10 BENIGN ESSENTIAL HYPERTENSION: ICD-10-CM

## 2019-10-28 PROCEDURE — 99607 MTMS BY PHARM ADDL 15 MIN: CPT | Performed by: PHARMACIST

## 2019-10-28 PROCEDURE — 99606 MTMS BY PHARM EST 15 MIN: CPT | Performed by: PHARMACIST

## 2019-10-28 NOTE — PATIENT INSTRUCTIONS
Recommendations from today's MTM visit:                                                      1. Talk to your pain doctors about low dose naltrexone and see if that would be an option for you.  Also talk to your pain doctors about Restorative physical therapy and a pain therapist (therapist who specializes in chronic pain). You could check into [facility] because I think they have these services. There number is [phone]    2. Make sure you are taking your calcium 600 mg twice daily.    For Dr. Osman or Dr. Murphy  1. Recommend rechecking TSH and Vitamin D level.  Determine if high dose vitamin D should be continued.     2. Consider doing DEXA to determine osteoporosis medication is needed.    3. Ensure to monitor CBC every 6 months since pt is on primidone.    4. Talk to Dr. Murphy and see if you need to keep taking folic acid and sodium bicarbonate.  It would be nice to get rid of some of your medicines.    It was great to speak with you today.  I value your experience and would be very thankful for your time with providing feedback on our clinic survey. You may receive a survey via email or text message in the next few days.     Next MTM visit: 2-3 months    To schedule another MTM appointment, please call the clinic directly or you may call the MTM scheduling line at [phone] or toll-free at [phone].     My Clinical Pharmacist's contact information:                                                      It was a pleasure talking with you today!  Please feel free to contact me with any questions or concerns you have.      Mary Jane Muhammad, Sascha  Medication Therapy Management Pharmacist  [facility] - Monday and Wednesday 7:30 - 4:00  Phone: [phone] - direct clinic line

## 2019-10-28 NOTE — PROGRESS NOTES
SUBJECTIVE/OBJECTIVE:                Annette Longo is a 69 year old female called for a follow-up visit for Medication Therapy Management.  She was referred to me from  [facility] nurse .    Chief Complaint: Follow up from [facility] visit on 9/16/19.  Pt was referred by Magy NAILS Registered nurse  from [facility].     Tobacco:  reports that she has never smoked. She has never used smokeless tobacco.  Alcohol: Social History    Substance and Sexual Activity      Alcohol use: Not on file    Allergies/ADRs: Reviewed in Epic  Tobacco: No tobacco use  Alcohol: not currently using  Caffeine: no caffeine  Activity: limited  PMH: Per patient she has 3 different stents placed with the last one placed 5 years ago - family heart history. Pt has an ostomy bag due to perforated colon during colonoscopy.    PCP with [facility]: Dr. Stewart Murphy  Cardiologist: Dr. Sue at [facility]  Endocrinologist: Dr. Rodney Osman - next visit Dec 9th  Nephrologist: Dr. [facility] in [place] - Dec 20th  Pain Clinic: Cant think of name but its in [facility]: Dr. Thompson    Medication Adherence/Access:  no issues reported    Patient is too afraid to switch to [facility] mail order pharmacy because she takes a lot of medicine and doesn't want a prescription to be filled if it interacts with another medicine she takes.    Psoriasis:  Current medications include: Humira 40 mg every 14 days.  Seeing Dr. Jose christopher the [facility]. Working with no side effects reported by pt.    Hypothyroidism: Patient is taking Synthroid 100 mcg daily. Patient is having the following symptoms: hypothyroidism -  cold intolerance, dry skin, fatigue, weight gain and constipation.   No results found for: TSH - pt unsure of last level. PCP monitors this. Last care everywhere was 2.37 on 8/8/18    Hypertension/CAD: Current medications include isosorbide dinitrate 10 mg three times daily, chlorthalidone  25 mg once daily, metoprolol ER 50 mg twice daily, hydralazine 50 mg twice daily, amlodipine 5 mg once daily, clopidogrel 75 mg once daily (she is allergic to aspirin).  Patient does not self-monitor BP.  Patient reports no current medication side effects. Pt reports no light headedness or dizziness.    Last Comprehensive Metabolic Panel:  Sodium   Date Value Ref Range Status   04/17/2018 134 133 - 144 mmol/L Final     Potassium   Date Value Ref Range Status   04/17/2018 4.5 3.4 - 5.3 mmol/L Final     Chloride   Date Value Ref Range Status   04/17/2018 102 94 - 109 mmol/L Final     Carbon Dioxide   Date Value Ref Range Status   04/17/2018 25 20 - 32 mmol/L Final     Anion Gap   Date Value Ref Range Status   04/17/2018 6 3 - 14 mmol/L Final     Glucose   Date Value Ref Range Status   04/17/2018 124 (H) 70 - 99 mg/dL Final     Urea Nitrogen   Date Value Ref Range Status   04/17/2018 21 7 - 30 mg/dL Final     Creatinine   Date Value Ref Range Status   04/17/2018 1.33 (H) 0.52 - 1.04 mg/dL Final     GFR Estimate   Date Value Ref Range Status   04/17/2018 40 (L) >60 mL/min/1.7m2 Final     Comment:     Non  GFR Calc     Calcium   Date Value Ref Range Status   04/17/2018 8.7 8.5 - 10.1 mg/dL Final       Osteoporosis?: Current therapy includes: calcium 600/Vitamin D 400 and Vitamin D supplements: 50,000 IU. Pt is not experiencing side effects.  Pt is getting the following sources of dietary calcium: yogurt once daily  Last vitamin D level: 40.8 on 11/14/17  DEXA History: unsure  Risk factors: unknown    Diabetes:  Pt currently taking Novolog 70/30 32 units twice daily. Pt is not experiencing side effects.  SMBG: three times daily.   Ranges (patient reported): before breakfast: 150-190; before bed: 119  Patient is not experiencing hypoglycemia  Recent symptoms of high blood sugar? none  Eye exam: up to date  Foot exam: unsure  ACEi/ARB: No.   Urine Albumin: No results found for: UMALCR   Aspirin: Not taking  due to allergy. On clopidogrel  Diet/Exercise: limited due to pain  Unsure of last A1C    Chronic back pain:  Current medications include: she has gotten hydromorphone 2 mg since last visit but it doesn't do anything for her back pain.  She reports that she cant spinal cord stimulation because she had a procedure in the past, not sure what procedure it was, but she will not do this again as she could not walk for three days.  She did have an injection in her back, it worked for 2 days and now back to square one. She reports that her back injury was due to lifting boxes at her last employer which was in 1987.  When she lifted the first box she fell to the ground and reports she didn't have feeling from her waist down.   She has had 4 back surgeries since the incident. She sees someone at a pain clinic in [place] but doesn't know her providers name.  Medications she has tried in the past: oxycodone and morphine.  She goes to [place] [facility] and she sees Dr. Thompson.     Vitamin D deficiency:  Current medications include: vitamin D 50,000 IU once a week.      Last value in Care Everywhere on 11/14/17: 40.8    Today's Vitals: There were no vitals taken for this visit. - phone visit    Last Care Everywhere bp was on 10/11/18: 142/58, pulse 96 O2: 99%    ASSESSMENT:                  Medication Adherence: good, no issues identified    Psoriasis:  Stable. Continue to follow with specialist at the [place].    Hypothyroidism: Needs Improvement. Recommend rechecking TSH since pt is having symptoms that would indicate hypothyroidism.    Hypertension/CAD: Needs Improvement. Patient is not meeting BP goal of < 140/90mmHg, however pt may have different goal set by her provider. I did encourage her to discuss reducing medications with her PCP and cardiologist if her blood pressure is well below goal. Unable to assess as this was a phone visit and I don't have any recent blood pressures.  I also recommended that patient talk  with her cardiologist to determine if clopidogrel is still needed 5 years post stent placement.    Osteoporosis?: Needs Improvement. Recommend pt discuss with PCP regarding DEXA scan and determine if patient would benefit from osteoporosis medication.  Recommended that the patient take her calcium every day twice daily.    Diabetes: Stable. Patient to continue to follow with endocrinologist.    Chronic back pain:  Needs Improvement.  Recommended that she discuss with her pain providers about trying a low dose naltrexone for her chronic pain.  I also encouraged her to look into restorative physical therapy and see a chronic pain therapist which could be helpful with central sensitization from chronic pain.    Vitamin D deficiency: Needs improvement.  Recommended that vitamin D level be rechecked to ensure patient's level is not too high.     PLAN:                   1. Talk to your pain doctors about low dose naltrexone and see if that would be an option for you.  Also talk to your pain doctors about Restorative physical therapy and a pain therapist (therapist who specializes in chronic pain). You could check into the [facility] because I think they have these services.    2. Make sure you are taking your calcium 600 mg twice daily.    For Dr. Osman or Dr. Murphy  1. Recommend rechecking TSH and Vitamin D level.  Determine if high dose vitamin D should be continued.     2. Consider doing DEXA to determine osteoporosis medication is needed.    3. Ensure to monitor CBC every 6 months since pt is on primidone.    4. Assess whether folic acid and sodium bicarbonate need to be continued.    Patient gave me a verbal order to call her friend who is an RN, Danita, and tell her the same information that I discuss with her today.  She trusts Danita and wanted me to give her this information.  I left a message with my contact info for her to call me back.    I spent 60 minutes with this patient today. A copy of the  visit note was provided to the patient's primary care provider.     Will follow up in 2-3 months.    The patient was mailed a summary of these recommendations as an after visit summary.    Mary Jane Muhammad, PharmD  Medication Therapy Management Pharmacist    Medications reconciled during this visit.   Current Outpatient Medications   Medication Sig Dispense Refill     adalimumab (HUMIRA) 40 MG/0.8ML prefilled syringe kit Inject 0.8 mLs (40 mg) Subcutaneous every 14 days 2 Syringe 0     amLODIPine (NORVASC) 5 MG tablet Take 5 mg by mouth daily       Atorvastatin Calcium (LIPITOR PO) Take 20 mg by mouth every morning        Calcium-Magnesium-Vitamin D (CALCIUM MAGNESIUM PO) Take 600 mg by mouth 2 times daily        CEPHALEXIN PO Take 500 mg by mouth daily Due to recurrent MRSA infection       CHLORTHALIDONE PO Take 25 mg by mouth every morning        CLOPIDOGREL BISULFATE PO Take 75 mg by mouth daily        cyanocobalamin (VITAMIN B-12) 1000 MCG tablet Take 1,000 mcg by mouth daily       FLUCONAZOLE PO Take 150 mg by mouth once As needed if one occurs       FOLIC ACID PO Take 800 mcg by mouth daily       hydrALAZINE (APRESOLINE) 50 MG tablet Take 50 mg by mouth 2 times daily        insulin aspart prot & aspart (NOVOLOG MIX 70/30 PEN) (70-30) 100 UNIT/ML pen Inject 32 Units Subcutaneous 2 times daily (with meals)        ISOSORBIDE DINITRATE PO Take 10 mg by mouth 3 times daily        Levothyroxine Sodium (SYNTHROID PO) Take 100 mcg by mouth every morning        Lysine 1000 MG TABS Take 1 tablet by mouth every evening To prevent cold sores       Melatonin 10 MG TABS tablet Take 10 mg by mouth At Bedtime       METOPROLOL SUCCINATE ER PO Take 50 mg by mouth 2 times daily        nystatin (MYCOSTATIN) cream Apply topically 2 times daily       nystatin POWD 1 Dose daily as needed       Omega-3 Fatty Acids (SALMON OIL-1000 PO) Take 2 capsules by mouth daily       oxyCODONE IR (ROXICODONE) 10 MG tablet Take 10 mg by mouth daily as  needed for severe pain       PRIMIDONE PO Take 250 mg by mouth 2 times daily        Pyridoxine HCl (VITAMIN B6 PO) Take 100 mg by mouth daily        SODIUM BICARBONATE PO Take 1,300 mg by mouth 3 times daily        triamcinolone (KENALOG) 0.1 % ointment Mix entire tube with 16ounce jar of moisturizer, apply at bedtime 80 g 4     TURMERIC PO Take 1,000 mg by mouth       venlafaxine (EFFEXOR XR) 150 MG 24 hr capsule Take 150 mg by mouth daily        vitamin D (ERGOCALCIFEROL) 08782 UNIT capsule Take 50,000 Units by mouth once a week        zinc 50 MG TABS Take 1 tablet by mouth daily

## 2019-10-31 ENCOUNTER — OFFICE VISIT (OUTPATIENT)
Dept: DERMATOLOGY | Facility: CLINIC | Age: 69
End: 2019-10-31
Payer: COMMERCIAL

## 2019-10-31 DIAGNOSIS — Z79.899 ENCOUNTER FOR LONG-TERM CURRENT USE OF HIGH RISK MEDICATION: Primary | ICD-10-CM

## 2019-10-31 DIAGNOSIS — L40.9 PSORIASIS: ICD-10-CM

## 2019-10-31 ASSESSMENT — PAIN SCALES - GENERAL: PAINLEVEL: NO PAIN (0)

## 2019-10-31 NOTE — PATIENT INSTRUCTIONS
For your Humira safety labs, each year you need to have a: CBC with diff, CMP and quant gold checked once per year.  Please have the results faxed to our office.          Jacky Lloyd MD, FAAD  1. [facility]  [address]  [place] [address]    Noemi Aldrich MD, MS, FAAD  2. [facility]  [address]  [place] [address]    Jennifer Parker MD, FAAD  3. [facility]  [address]  [place] [address]    Luisito Salvador MD, FAAD  4. [facility]  [address]  [place] [address]

## 2019-10-31 NOTE — LETTER
"10/31/2019       RE: Annette Longo  [address] [address]     Dear Colleague,    Thank you for referring your patient, Annette Longo, to the [facility] DERMATOLOGY at [facility]. Please see a copy of my visit note below.    [facility] Dermatology Note    Dermatology Problem List:  1. Psoriasis   -s/p bx 3/22/18 consistent with psoriasis   - current tx: Humira 40 mg every 2 weeks (initiated 4/2018)  - last Quant Gold 4/2018, negative   - Previous therapies: methotrexate, phototherapy with some improvement but not adequate control    Encounter Date: Oct 31, 2019    CC:   Chief Complaint   Patient presents with     Derm Problem     Psoriasis f/u - PT states \"Its pretty much cleared up\"     History of Present Illness:  Ms. Annette Longo is a 69 year old female who presents for evaluation of her psoriasis.  She was last seen in clinic on  10/18/18 at which time her psoriasis was well controlled with Humira.  She is not currently applying any topical medications to treat her psoriasis.  Today, she denies any active areas of psoriasis. In the past, she had involvement of her gluteal cleft and medial buttocks. In the past, she has experienced rheumatoid and osteoarthritis (and was seen by a rheumatologist), which has improved with the Humira.  Her last Humira safety labs were obtained on 4/2019 which were notable for mildly low Hgb 10.7, and an elevated Cr 1.33.    Ms. Longo is concerned about the travel required to come to the [facility] [place] and is interested in transitioning care to a dermatologist closer to [place].  She denies any side effects from the Humira such as recent infections, heart failure, injection site reaction, etc.  No additional lesions or concerns that she would like to address today.      Past Medical History:   Patient Active Problem List   Diagnosis     Rash     Lichen simplex chronicus     Psoriasis "     Encounter for long-term current use of high risk medication     No past medical history on file.  No past surgical history on file.    Social History:  Patient reports that she has never smoked. She has never used smokeless tobacco.    Family History:  Family History   Problem Relation Age of Onset     Skin Cancer Sister        Medications:  Current Outpatient Medications   Medication Sig Dispense Refill     adalimumab (HUMIRA) 40 MG/0.8ML prefilled syringe kit Inject 0.8 mLs (40 mg) Subcutaneous every 14 days 2 Syringe 0     amLODIPine (NORVASC) 5 MG tablet Take 5 mg by mouth daily       Atorvastatin Calcium (LIPITOR PO) Take 20 mg by mouth every morning        Calcium-Magnesium-Vitamin D (CALCIUM MAGNESIUM PO) Take 600 mg by mouth 2 times daily        CEPHALEXIN PO Take 500 mg by mouth daily Due to recurrent MRSA infection       CHLORTHALIDONE PO Take 25 mg by mouth every morning        CLOPIDOGREL BISULFATE PO Take 75 mg by mouth daily        cyanocobalamin (VITAMIN B-12) 1000 MCG tablet Take 1,000 mcg by mouth daily       FLUCONAZOLE PO Take 150 mg by mouth once As needed if one occurs       FOLIC ACID PO Take 800 mcg by mouth daily       hydrALAZINE (APRESOLINE) 50 MG tablet Take 50 mg by mouth 2 times daily        insulin aspart prot & aspart (NOVOLOG MIX 70/30 PEN) (70-30) 100 UNIT/ML pen Inject 32 Units Subcutaneous 2 times daily (with meals)        ISOSORBIDE DINITRATE PO Take 10 mg by mouth 3 times daily        Levothyroxine Sodium (SYNTHROID PO) Take 100 mcg by mouth every morning        Lysine 1000 MG TABS Take 1 tablet by mouth every evening To prevent cold sores       Melatonin 10 MG TABS tablet Take 10 mg by mouth At Bedtime       METOPROLOL SUCCINATE ER PO Take 50 mg by mouth 2 times daily        nystatin (MYCOSTATIN) cream Apply topically 2 times daily       nystatin POWD 1 Dose daily as needed       Omega-3 Fatty Acids (SALMON OIL-1000 PO) Take 2 capsules by mouth daily       oxyCODONE IR  (ROXICODONE) 10 MG tablet Take 10 mg by mouth daily as needed for severe pain       PRIMIDONE PO Take 250 mg by mouth 2 times daily        Pyridoxine HCl (VITAMIN B6 PO) Take 100 mg by mouth daily        SODIUM BICARBONATE PO Take 1,300 mg by mouth 3 times daily        triamcinolone (KENALOG) 0.1 % ointment Mix entire tube with 16ounce jar of moisturizer, apply at bedtime 80 g 4     TURMERIC PO Take 1,000 mg by mouth       venlafaxine (EFFEXOR XR) 150 MG 24 hr capsule Take 150 mg by mouth daily        vitamin D (ERGOCALCIFEROL) 75143 UNIT capsule Take 50,000 Units by mouth once a week        zinc 50 MG TABS Take 1 tablet by mouth daily           Allergies   Allergen Reactions     Aspirin Anaphylaxis     Mirtazapine Unknown     Adhesive Tape Rash     Bupropion Unknown     Ciprofloxacin Rash     Codeine Unknown, Nausea and Vomiting and Rash     Tolterodine Other (See Comments)     Ace Inhibitors Unknown and Other (See Comments)     No Clinical Screening - See Comments Swelling     In high school     Cyclobenzaprine Unknown and Other (See Comments)     Review of Systems:  -As per HPI  -Constitutional: Otherwise feeling well today, in usual state of health.  -HEENT: Patient denies nonhealing oral sores.  -Skin: As above in HPI. No additional skin concerns.  10 point ROS otherwise negative    Physical exam:  Vitals: There were no vitals taken for this visit.  GEN: This is a well developed, well-nourished female in no acute distress, in a pleasant mood.    SKIN: Focused examination of the face and arms was performed.  -Mccarty skin type: I  - no psoriasiform plaques present on examination today  - no nail pitting present   -No other lesions of concern on areas examined.     Impression/Plan:  1. Psoriasis, 0% BSA present on examination today. Pt is very pleased with improvement of her psoriasis on Humira.     Continue Humira 40 mg every 2 weeks    Pt unable to stay for safety lab monitoring today, will plan to have pt  check CBC with diff, CMP, and quant gold when she has labs checked with her PCP in Dec 2019    Pt was provided a list of Dermatologists and clinics closer to her home to contact to establish care and monitor her Humira.     Recommended applying TAC 0.1% ointment to affected areas of psoriasis as they develop    CC Myles Garcia MD  [address] [place] [address] [address]  [place] [address] on close of this encounter.    Follow-up prn for new or changing lesions.     Dr. Garcia staffed the patient.    Staff Involved:  Resident(Dr. Rice)/Staff (Dr. Garcia)    I have seen and examined this patient and agree with the assessment and plan as documented in the resident's note.    Myles Garcia MD  Dermatology Attending

## 2019-10-31 NOTE — PROGRESS NOTES
"[facility] Dermatology Note    Dermatology Problem List:  1. Psoriasis   -s/p bx 3/22/18 consistent with psoriasis   - current tx: Humira 40 mg every 2 weeks (initiated 4/2018)  - last Quant Gold 4/2018, negative   - Previous therapies: methotrexate, phototherapy with some improvement but not adequate control    Encounter Date: Oct 31, 2019    CC:   Chief Complaint   Patient presents with     Derm Problem     Psoriasis f/u - PT states \"Its pretty much cleared up\"     History of Present Illness:  Ms. Annette Longo is a 69 year old female who presents for evaluation of her psoriasis.  She was last seen in clinic on  10/18/18 at which time her psoriasis was well controlled with Humira.  She is not currently applying any topical medications to treat her psoriasis.  Today, she denies any active areas of psoriasis. In the past, she had involvement of her gluteal cleft and medial buttocks. In the past, she has experienced rheumatoid and osteoarthritis (and was seen by a rheumatologist), which has improved with the Humira.  Her last Humira safety labs were obtained on 4/2019 which were notable for mildly low Hgb 10.7, and an elevated Cr 1.33.    Ms. Longo is concerned about the travel required to come to the [facility] from [place] and is interested in transitioning care to a dermatologist closer to [place].  She denies any side effects from the Humira such as recent infections, heart failure, injection site reaction, etc.  No additional lesions or concerns that she would like to address today.      Past Medical History:   Patient Active Problem List   Diagnosis     Rash     Lichen simplex chronicus     Psoriasis     Encounter for long-term current use of high risk medication     No past medical history on file.  No past surgical history on file.    Social History:  Patient reports that she has never smoked. She has never used smokeless tobacco.    Family History:  Family History   Problem Relation " Age of Onset     Skin Cancer Sister        Medications:  Current Outpatient Medications   Medication Sig Dispense Refill     adalimumab (HUMIRA) 40 MG/0.8ML prefilled syringe kit Inject 0.8 mLs (40 mg) Subcutaneous every 14 days 2 Syringe 0     amLODIPine (NORVASC) 5 MG tablet Take 5 mg by mouth daily       Atorvastatin Calcium (LIPITOR PO) Take 20 mg by mouth every morning        Calcium-Magnesium-Vitamin D (CALCIUM MAGNESIUM PO) Take 600 mg by mouth 2 times daily        CEPHALEXIN PO Take 500 mg by mouth daily Due to recurrent MRSA infection       CHLORTHALIDONE PO Take 25 mg by mouth every morning        CLOPIDOGREL BISULFATE PO Take 75 mg by mouth daily        cyanocobalamin (VITAMIN B-12) 1000 MCG tablet Take 1,000 mcg by mouth daily       FLUCONAZOLE PO Take 150 mg by mouth once As needed if one occurs       FOLIC ACID PO Take 800 mcg by mouth daily       hydrALAZINE (APRESOLINE) 50 MG tablet Take 50 mg by mouth 2 times daily        insulin aspart prot & aspart (NOVOLOG MIX 70/30 PEN) (70-30) 100 UNIT/ML pen Inject 32 Units Subcutaneous 2 times daily (with meals)        ISOSORBIDE DINITRATE PO Take 10 mg by mouth 3 times daily        Levothyroxine Sodium (SYNTHROID PO) Take 100 mcg by mouth every morning        Lysine 1000 MG TABS Take 1 tablet by mouth every evening To prevent cold sores       Melatonin 10 MG TABS tablet Take 10 mg by mouth At Bedtime       METOPROLOL SUCCINATE ER PO Take 50 mg by mouth 2 times daily        nystatin (MYCOSTATIN) cream Apply topically 2 times daily       nystatin POWD 1 Dose daily as needed       Omega-3 Fatty Acids (SALMON OIL-1000 PO) Take 2 capsules by mouth daily       oxyCODONE IR (ROXICODONE) 10 MG tablet Take 10 mg by mouth daily as needed for severe pain       PRIMIDONE PO Take 250 mg by mouth 2 times daily        Pyridoxine HCl (VITAMIN B6 PO) Take 100 mg by mouth daily        SODIUM BICARBONATE PO Take 1,300 mg by mouth 3 times daily        triamcinolone (KENALOG)  0.1 % ointment Mix entire tube with 16ounce jar of moisturizer, apply at bedtime 80 g 4     TURMERIC PO Take 1,000 mg by mouth       venlafaxine (EFFEXOR XR) 150 MG 24 hr capsule Take 150 mg by mouth daily        vitamin D (ERGOCALCIFEROL) 73766 UNIT capsule Take 50,000 Units by mouth once a week        zinc 50 MG TABS Take 1 tablet by mouth daily           Allergies   Allergen Reactions     Aspirin Anaphylaxis     Mirtazapine Unknown     Adhesive Tape Rash     Bupropion Unknown     Ciprofloxacin Rash     Codeine Unknown, Nausea and Vomiting and Rash     Tolterodine Other (See Comments)     Ace Inhibitors Unknown and Other (See Comments)     No Clinical Screening - See Comments Swelling     In high school     Cyclobenzaprine Unknown and Other (See Comments)     Review of Systems:  -As per HPI  -Constitutional: Otherwise feeling well today, in usual state of health.  -HEENT: Patient denies nonhealing oral sores.  -Skin: As above in HPI. No additional skin concerns.  10 point ROS otherwise negative    Physical exam:  Vitals: There were no vitals taken for this visit.  GEN: This is a well developed, well-nourished female in no acute distress, in a pleasant mood.    SKIN: Focused examination of the face and arms was performed.  -Mccarty skin type: I  - no psoriasiform plaques present on examination today  - no nail pitting present   -No other lesions of concern on areas examined.     Impression/Plan:  1. Psoriasis, 0% BSA present on examination today. Pt is very pleased with improvement of her psoriasis on Humira.     Continue Humira 40 mg every 2 weeks    Pt unable to stay for safety lab monitoring today, will plan to have pt check CBC with diff, CMP, and quant gold when she has labs checked with her PCP in Dec 2019    Pt was provided a list of Dermatologists and clinics closer to her home to contact to establish care and monitor her Humira.     Recommended applying TAC 0.1% ointment to affected areas of psoriasis  as they develop    CC Myles Garcia MD  [address] [place] [address] [address]  [place] [address] on close of this encounter.    Follow-up prn for new or changing lesions.     Dr. Garcia staffed the patient.    Staff Involved:  Resident(Dr. Rice)/Staff (Dr. Garcia)    I have seen and examined this patient and agree with the assessment and plan as documented in the resident's note.    Myles Garcia MD  Dermatology Attending

## 2019-10-31 NOTE — NURSING NOTE
"Dermatology Rooming Note    Annette Longo's goals for this visit include:   Chief Complaint   Patient presents with     Derm Problem     Psoriasis f/u - PT states \"Its pretty much cleared up\"     Lauren Padilla. EMT    "

## 2019-11-04 NOTE — PROGRESS NOTES
Patient's friend Danita who is a nurse call me back and left me a message with the following information.  The patient is taking sodium bicarb because she has stage III kidney disease so she needs to continue taking this.  She also reported that Dr. Marshall or Dr. Osman will be checking her vitamin D.  Dr. Dunbar is her pain specialist and they will be looking at doing joint injection and possibly a nerve root block at L2 since pain medications do not seem to work for her.  She has had trouble doing PT in the past since this is a work comp claim due to injury and usually the payment gets drug out and its pain to deal with.  Danita did tell me that she helps the patient set up her medications just because the patient feels it takes too long to get everything set up in its nice to have help.    The patient did also call me and left me a message stating that the folic acid and sodium bicarb need to be continued.  She stated that she has an ostomy bag and stage III kidney disease.    Pt also sent me her med and allergy list. Updated allergies today.    Mary Jane Muhammad, PharmD  Medication Therapy Management Pharmacist

## 2019-12-09 ENCOUNTER — TRANSFERRED RECORDS (OUTPATIENT)
Dept: HEALTH INFORMATION MANAGEMENT | Facility: CLINIC | Age: 69
End: 2019-12-09

## 2020-01-02 DIAGNOSIS — L40.9 PSORIASIS: ICD-10-CM

## 2020-01-04 NOTE — TELEPHONE ENCOUNTER
"   adalimumab (HUMIRA) 40 MG/0.8ML prefilled syringe kit  Last Written Prescription Date:  10/31/19  Last Fill Quantity: 2 syrg,   # refills: 2  Last Office Visit : 10/31/19  Future Office visit: none    \"Continue Humira 40 mg every 2 weeks\"      Routing refill request to provider for review/approval because: not on protocol. Per last note pt was given provider list to find one closer  to home. Due to back problems she has not found another provider. States she will find one. Would you be willing to sign another order? thanks  "

## 2020-01-05 NOTE — TELEPHONE ENCOUNTER
Received refill request as [id number]. Patient chart reviewed. Refill refused. Do not see updated labs for monitoring including TB test in the system. If patient had them drawn via outside facilities, will need to authorize release of records into Care Everywhere prior to accepting refill request.    Debra Mendez MD (James)  Dermatology Resident  [facility]

## 2020-01-06 ENCOUNTER — TELEPHONE (OUTPATIENT)
Dept: OPHTHALMOLOGY | Facility: CLINIC | Age: 70
End: 2020-01-06

## 2020-01-06 DIAGNOSIS — Z53.9 ERRONEOUS ENCOUNTER--DISREGARD: Primary | ICD-10-CM

## 2020-01-06 NOTE — TELEPHONE ENCOUNTER
[facility] Call Center    Phone Message    May a detailed message be left on voicemail: yes    Reason for Call: Medication Refill Request    Has the patient contacted the pharmacy for the refill? Yes adalimumab (HUMIRA) 40 MG/0.8ML prefilled syringe kit  Name of medication being requested:   Provider who prescribed the medication: Dr Garcia  Pharmacy: [facility] - [address]  Date medication is needed: ASAP         Action Taken: Message routed to:  [facility] derm

## 2020-01-06 NOTE — TELEPHONE ENCOUNTER
"I spoke with pt and she was upset that I called her \" int he middle of the night.\" She states she had labs done through [facility] with Dr. Osman, I was unable to find labs in Care Everywhere and attempted to reach nursing staff at Dr. Osman office- was transferred to medical records and phone disconnected.   "

## 2020-01-06 NOTE — TELEPHONE ENCOUNTER
I called the patient and I left a message asking that she call back. We need to get the labs sent to us. We were unable to find them in care everywhere. I caleld Dr. Osman office at [phone] and I spoke with [facility]. She stated that they would fax them right over.   Marjan Leach, CMA

## 2020-01-08 NOTE — TELEPHONE ENCOUNTER
[facility] Call Center    Phone Message    May a detailed message be left on voicemail: yes    Reason for Call: Medication Refill Request    Has the patient contacted the pharmacy for the refill? Yes   Name of medication being requested: humira  Provider who prescribed the medication: Jose  Pharmacy: [facility] specialty  Date medication is needed: NOW-they are trying to ship out asap   Please call [facility] pharmacist at [phone], option 3, option 2. Thanks      Action Taken: Message routed to:  [facility] derm

## 2020-06-30 DIAGNOSIS — L40.9 PSORIASIS: ICD-10-CM

## 2020-07-02 DIAGNOSIS — L40.9 PSORIASIS: ICD-10-CM

## 2020-07-02 NOTE — TELEPHONE ENCOUNTER
[facility] Call Center    Phone Message    May a detailed message be left on voicemail: no     Reason for Call: Medication Refill Request    Has the patient contacted the pharmacy for the refill? Yes   Name of medication being requested: Humira      Provider who prescribed the medication: Jose       Pharmacy: [facility] - [address]     Date medication is needed: asap     Action Taken: Message routed to:  [facility] derm    Travel Screening: Not Applicable

## 2020-07-02 NOTE — TELEPHONE ENCOUNTER
adalimumab (HUMIRA) 40 MG/0.8ML prefilled syringe kit       Last Written Prescription Date:  1-9-2020  Last Fill Quantity: 6,   # refills: 1  Last Office Visit : 10-31-19  Future Office visit:  none    Routing refill request to provider for review/approval because:  Not on protocol.        Kathleen M Doege RN

## 2020-07-03 DIAGNOSIS — Z79.899 ENCOUNTER FOR LONG-TERM CURRENT USE OF HIGH RISK MEDICATION: Primary | ICD-10-CM

## 2020-07-03 NOTE — TELEPHONE ENCOUNTER
My name is Dr. Micheal Cruz, and I am the dermatology resident on call.    This LIMITED refill request (with zero additional refills) has been approved as a courtesy to the patient given COVID-19 and that a lapse in taking Humira can decrease its overall effectiveness. The patient should be seen as described in the last clinic note within 1-2 months as this is overdue now with Dr. Myles Garcia. Future refill requests may not be approved without a clinic appointment. A message to the dermatology schedulers has been sent to facilitate scheduling the patient for the follow up appointment.     The patient's last Humira safety lab work (CBC with diff, CMP, and quantiFERON gold) were in December 2019. A repeat CBC with diff and CMP have been ordered.

## 2020-07-03 NOTE — PROGRESS NOTES
"- Ordered Humira safety lab work (CBC with diff and CMP) as last data was of December 2019, and the patient is requesting a refill  - A 1-month supply of Humira is being granted as a courtesy to the patient, but the patient must obtain ordered lab work ASAP  - The patient should be seen by a dermatologist (alea okay) within 1-2 months for \"psoriasis Humira follow-up.\" I have asked our schedulers to facilitate this.  "

## 2020-07-28 ENCOUNTER — TELEPHONE (OUTPATIENT)
Dept: DERMATOLOGY | Facility: CLINIC | Age: 70
End: 2020-07-28

## 2020-07-30 ENCOUNTER — VIRTUAL VISIT (OUTPATIENT)
Dept: DERMATOLOGY | Facility: CLINIC | Age: 70
End: 2020-07-30
Payer: COMMERCIAL

## 2020-07-30 DIAGNOSIS — L40.9 PSORIASIS: Primary | ICD-10-CM

## 2020-07-30 DIAGNOSIS — Z79.899 ENCOUNTER FOR LONG-TERM CURRENT USE OF HIGH RISK MEDICATION: ICD-10-CM

## 2020-07-30 NOTE — NURSING NOTE
"Chief Complaint   Patient presents with     Derm Problem     Annette is being seen today via telephone for psoriasis. She states \" my psoriasis is doing pretty darn good\"       Anabell Palacios, HEATHER  "

## 2020-07-30 NOTE — LETTER
7/30/2020       RE: Annette Longo  [address] [address]     Dear Colleague,    Thank you for referring your patient, Annette Longo, to the [facility] DERMATOLOGY at [facility]. Please see a copy of my visit note below.    [facility] Dermatology Telephone Note     Dermatology Problem List:  1. Psoriasis   -s/p bx 3/22/18 consistent with psoriasis   - current tx: Humira 40 mg every 2 weeks (initiated 4/2018)  - last Quant Gold 4/2018, negative   - Previous therapies: methotrexate, phototherapy with some improvement but not adequate control     Encounter Date: 7/30/20    Telephone visit; I spoke with patient for the following issue:   Psoriasis, on Humira    Phone call contact time:  Call Started at: 2:20pm  Call Ended at: 2:35pm    History obtained during phone call:  Annette is a 70 year old female with history of psoriasis predominantly affecting the buttocks, who started Humira in 2018 after a biopsy confirmed this diagnosis.  She was last seen in our clinic 10/31/19,at which time she had very good control of all areas of skin involvement.    Today she reports that since then:  Doing well, no recent flares.  Typically affected areas on buttocks remain clear, and she denies any new areas of involvement.  She denies any notable side effects from Humira.  Ongoing fatigue, which she suspects is due to other meds, otherwise 10 point ROS negative.    ASSESSMENT AND PLAN:  1. Psoriasis.  Pt is very pleased with improvement of her psoriasis on Humira. Currently reports ongoing entire clearance.  - Continue Humira 40 mg every 2 weeks  - Safety labs ordered - CBC with diff and CMP, asked patient to have these drawn in the next month (had QFNg in Dec 2019; not due for repeat check at this time)    RTC 6 months    Myles Garcia MD  Dermatology Attending

## 2020-07-30 NOTE — PATIENT INSTRUCTIONS
Preventive Care:    Diabetic Eye Exam Screening: During our visit today, we discussed that it is recommended you receive diabetic eye exam screening. Please call or make an appointment with your primary care provider to discuss this with them. You may also call the [facility] scheduling line ([phone]) to set up an eye exam at one of the [facility] Eye Clinics.

## 2020-07-31 ENCOUNTER — TELEPHONE (OUTPATIENT)
Dept: DERMATOLOGY | Facility: CLINIC | Age: 70
End: 2020-07-31

## 2020-07-31 NOTE — TELEPHONE ENCOUNTER
PA Initiation    Medication: Humira - Pending  Insurance Company: [employer] - Phone [phone] Fax [phone]  Pharm[facility] Filling the Rx[facility] [place]  Filling Pharmacy Phone:    Filling Pharmacy Fax:    Start Date: 7/31/2020

## 2020-08-03 NOTE — TELEPHONE ENCOUNTER
Prior Authorization Approval    Authorization Effective Date: 7/31/2020  Authorization Expiration Date: 7/31/2021  Medication: Humira - PA APPROVED  Approved Dose/Quantity: 40 MG/0.8ML / 4  Reference #: CMM key [id number]   Insurance Company: [employer] - Phone [phone] Fax [phone]  Expected CoPay:       CoPay Card Available:      Foundation Assistance Needed:    Which Pharmacy is filling the prescription (Not needed for infusion/clinic administered): [facility]  Pharmacy Notified:    Patient Notified:      Approval Letter:

## 2020-10-22 ENCOUNTER — TRANSCRIBE ORDERS (OUTPATIENT)
Dept: OTHER | Age: 70
End: 2020-10-22

## 2020-10-22 DIAGNOSIS — L40.9 PSORIASIS: Primary | ICD-10-CM

## 2021-03-05 ENCOUNTER — IMMUNIZATION (OUTPATIENT)
Dept: NURSING | Facility: CLINIC | Age: 71
End: 2021-03-05
Payer: COMMERCIAL

## 2021-03-05 PROCEDURE — 0011A PR COVID VAC MODERNA 100 MCG/0.5 ML IM: CPT

## 2021-03-05 PROCEDURE — 91301 PR COVID VAC MODERNA 100 MCG/0.5 ML IM: CPT

## 2021-04-02 ENCOUNTER — IMMUNIZATION (OUTPATIENT)
Dept: NURSING | Facility: CLINIC | Age: 71
End: 2021-04-02
Attending: INTERNAL MEDICINE
Payer: COMMERCIAL

## 2021-04-02 PROCEDURE — 91301 PR COVID VAC MODERNA 100 MCG/0.5 ML IM: CPT

## 2021-04-02 PROCEDURE — 0012A PR COVID VAC MODERNA 100 MCG/0.5 ML IM: CPT

## 2021-04-04 ENCOUNTER — HEALTH MAINTENANCE LETTER (OUTPATIENT)
Age: 71
End: 2021-04-04

## 2021-07-21 DIAGNOSIS — L40.9 PSORIASIS: ICD-10-CM

## 2021-07-22 ENCOUNTER — TELEPHONE (OUTPATIENT)
Dept: DERMATOLOGY | Facility: CLINIC | Age: 71
End: 2021-07-22

## 2021-07-22 NOTE — TELEPHONE ENCOUNTER
PA Initiation    Medication: Humira  Insurance Company: [employer] - Phone [phone] Fax [phone]  [facility]  Filling Pharmacy Phone:    Filling Pharmacy Fax:    Start Date: 7/21/2021    Faxed the prior authorization to [facility]

## 2021-07-23 ENCOUNTER — TELEPHONE (OUTPATIENT)
Dept: DERMATOLOGY | Facility: CLINIC | Age: 71
End: 2021-07-23

## 2021-07-23 NOTE — TELEPHONE ENCOUNTER
HUMIRA 40MG SYRINGE (2/BOX) 40MG/0.    Last Written Prescription Date:  7/30/2020  Last Fill Quantity: 6,   # refills: 4  Last Office Visit : 7/30/2020  Future Office visit:  None    Routing refill request to provider for review/approval because:  Drug not on the [facility], [facility] or [facility] refill protocol or controlled substance      Nahomy Vera RN  Central Triage Red Flags/Med Refills

## 2021-07-23 NOTE — TELEPHONE ENCOUNTER
"Patient declined to schedule a 2-3 month follow up with Dr. Garcia in Dermatology stated she will be taking her care else where because it's \"not safe to come to [place].\"     Valentine Alarcon  Dermatology Clinic Coordinator  7/23/21  "

## 2021-07-23 NOTE — TELEPHONE ENCOUNTER
Received refill request for Humira as the resident on call. Reviewed patient's chart and attached communication. Patient last seen 7/30/20 for psoraiasis. Last quant gold is from 2019. RTC not yet scheduled. After reviewing the medication list and assessment and plan from last visit, the refill request was accepted for two month s supply until patient can be scheduled in clinic. No further refills should be granted until patient is scheduled in clinic.    The patient's information will be forwarded to the scheduling pool for return visit as planned at prior visit.    CC'ing Dr. Garcia as a FABY Rice  PGY-4 Dermatology Resident  Pager: [phone]

## 2021-07-25 ENCOUNTER — HEALTH MAINTENANCE LETTER (OUTPATIENT)
Age: 71
End: 2021-07-25

## 2021-07-27 NOTE — TELEPHONE ENCOUNTER
Prior Authorization Approval    Authorization Effective Date: 7/24/2021  Authorization Expiration Date: 7/24/2022  Medication: Humira  Approved Dose/Quantity:   Reference #:     Insurance Company: [employer] - Phone [phone] Fax [phone]  Expected CoPay:       CoPay Card Available:      Foundation Assistance Needed:    Which Pharmacy is filling the prescription (Not needed for infusion/clinic administered): [facility]  Pharmacy Notified: Yes  Patient Notified: Yes

## 2021-07-29 NOTE — TELEPHONE ENCOUNTER
[facility] Call Center    Phone Message    May a detailed message be left on voicemail: no     Reason for Call: Medication Refill Request    Has the patient contacted the pharmacy for the refill? Yes   Name of medication being requested:   adalimumab (HUMIRA) 40 MG/0.8ML prefilled syringe kit     Provider who prescribed the medication:   Dr Garcia    Pharmacy:   [facility]    Date medication is needed: ASAP     Action Taken: Message routed to:  [facility] Derm    Travel Screening: Not Applicable     Caller states that refill has not yet been received by Pharmacy. Please resend Rx for refill. Thanks!

## 2021-09-02 DIAGNOSIS — L40.9 PSORIASIS: ICD-10-CM

## 2021-09-07 NOTE — TELEPHONE ENCOUNTER
"Received refill request for Humira as the resident on call.   Reviewed patient's chart and attached communication.   Patient last seen 7/30/20 for psoriasis. Recommended follow up in 6 months. RTC not scheduled. No safety labs checked within past 1 year.     Per telephone encounter 7/2021: \"Patient declined to schedule a 2-3 month follow up with Dr. Garcia in Dermatology stated she will be taking her care else where because it's \"not safe to come to [place].\"\"     After reviewing the medication list and assessment and plan from last visit, the refill request was DENIED.     Patient needs to be seen in clinic and have labs drawn for refills.     CC'ing Dr. Garcia as FYI.    Apollo Ly MD  PGY-2 Dermatology      "

## 2021-09-07 NOTE — TELEPHONE ENCOUNTER
adalimumab (HUMIRA) 40 MG/0.8ML prefilled syringe kit   Last Written Prescription Date:  7-  Last Fill Quantity: 2,   # refills: 1  Last Office Visit : 7-  Future Office visit:  none    Routing refill request to provider for review/approval because:  Not on protocol.  Overdue for appointment.  Scheduling has been notified to contact the pt for appointment.        Kathleen M Doege RN

## 2021-09-15 ENCOUNTER — TELEPHONE (OUTPATIENT)
Dept: DERMATOLOGY | Facility: CLINIC | Age: 71
End: 2021-09-15

## 2021-09-15 NOTE — TELEPHONE ENCOUNTER
[facility] Call Center    Phone Message    May a detailed message be left on voicemail: yes     Reason for Call: Medication Refill Request    Has the patient contacted the pharmacy for the refill? Yes   Name of medication being requested: Humiar 40MG Syringe  Provider who prescribed the medication: Dr. Garcia  Pharmacy: [facility] - [address]  Date medication is needed: ASAP     Action Taken: Message routed to:  [facility] Derm    Travel Screening: Not Applicable

## 2021-09-15 NOTE — TELEPHONE ENCOUNTER
adalimumab (HUMIRA) 40 MG/0.8ML prefilled syringe: RF Denied  see 9-2-21 RF encounter- pharm called

## 2021-09-19 ENCOUNTER — HEALTH MAINTENANCE LETTER (OUTPATIENT)
Age: 71
End: 2021-09-19

## 2021-11-14 ENCOUNTER — HEALTH MAINTENANCE LETTER (OUTPATIENT)
Age: 71
End: 2021-11-14

## 2022-03-06 ENCOUNTER — HEALTH MAINTENANCE LETTER (OUTPATIENT)
Age: 72
End: 2022-03-06

## 2022-05-01 ENCOUNTER — HEALTH MAINTENANCE LETTER (OUTPATIENT)
Age: 72
End: 2022-05-01

## 2022-06-26 ENCOUNTER — HEALTH MAINTENANCE LETTER (OUTPATIENT)
Age: 72
End: 2022-06-26

## 2022-11-21 ENCOUNTER — HEALTH MAINTENANCE LETTER (OUTPATIENT)
Age: 72
End: 2022-11-21

## 2023-04-16 ENCOUNTER — HEALTH MAINTENANCE LETTER (OUTPATIENT)
Age: 73
End: 2023-04-16

## 2023-06-02 ENCOUNTER — HEALTH MAINTENANCE LETTER (OUTPATIENT)
Age: 73
End: 2023-06-02

## 2024-06-22 ENCOUNTER — HEALTH MAINTENANCE LETTER (OUTPATIENT)
Age: 74
End: 2024-06-22

## 2024-09-03 NOTE — TELEPHONE ENCOUNTER
Please see refill enounter.   Marjan Leach, CMA       Refill request received for   levothyroxine 150 MCG tablet       Last refill 8/22/24 qty 96 refills 1    According to Dr. Virgen' note on 8/22/24    (See result note)     Patient would like to decrease dose to 150 mcg 6.5 tabs/week     Labs per protocol   Component      Latest Ref Rng 8/21/2024  12:43 PM   TSH      0.350 - 5.000 mcUnits/mL 0.063 (L)    T4, FREE      0.8 - 1.5 ng/dL 1.9 (H)         Follow up appointment Param 1/8/25    Refill refused. Requested to early.